# Patient Record
Sex: FEMALE | Race: WHITE | NOT HISPANIC OR LATINO | Employment: FULL TIME | URBAN - METROPOLITAN AREA
[De-identification: names, ages, dates, MRNs, and addresses within clinical notes are randomized per-mention and may not be internally consistent; named-entity substitution may affect disease eponyms.]

---

## 2019-07-24 LAB
EXTERNAL CHLAMYDIA RESULT: NEGATIVE
N GONORRHOEA RRNA SPEC QL PROBE: NEGATIVE

## 2019-11-14 ENCOUNTER — APPOINTMENT (OUTPATIENT)
Dept: LAB | Facility: CLINIC | Age: 54
End: 2019-11-14
Payer: COMMERCIAL

## 2019-11-14 ENCOUNTER — TRANSCRIBE ORDERS (OUTPATIENT)
Dept: LAB | Facility: CLINIC | Age: 54
End: 2019-11-14

## 2019-11-14 DIAGNOSIS — D46.9 MDS (MYELODYSPLASTIC SYNDROME) (HCC): Primary | ICD-10-CM

## 2019-11-14 DIAGNOSIS — D46.9 MDS (MYELODYSPLASTIC SYNDROME) (HCC): ICD-10-CM

## 2019-11-14 PROCEDURE — 36415 COLL VENOUS BLD VENIPUNCTURE: CPT

## 2019-11-15 LAB — MISCELLANEOUS LAB TEST RESULT: NORMAL

## 2020-07-10 LAB
HBA1C MFR BLD HPLC: 5.3 %
HBA1C MFR BLD HPLC: 5.3 %

## 2020-08-24 ENCOUNTER — TELEMEDICINE (OUTPATIENT)
Dept: GASTROENTEROLOGY | Facility: CLINIC | Age: 55
End: 2020-08-24
Payer: COMMERCIAL

## 2020-08-24 DIAGNOSIS — Z12.11 SCREENING FOR COLON CANCER: ICD-10-CM

## 2020-08-24 DIAGNOSIS — R79.89 ELEVATED LFTS: Primary | ICD-10-CM

## 2020-08-24 PROCEDURE — 99204 OFFICE O/P NEW MOD 45 MIN: CPT | Performed by: INTERNAL MEDICINE
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
## 2020-08-25 ENCOUNTER — TELEPHONE (OUTPATIENT)
Dept: GASTROENTEROLOGY | Facility: CLINIC | Age: 55
End: 2020-08-25

## 2020-08-25 NOTE — TELEPHONE ENCOUNTER
Pt left [facility] asking for a nurse to CB to [phone]; had a virtual appt yesterday w/ Dr Ronny Garza and has questions about what he is requesting she do

## 2020-08-25 NOTE — TELEPHONE ENCOUNTER
I reviewed the liver enzymes from August   These were not available yesterday at the visit  As she says these are almost completely normal   We can for go all the other tests and simply send her a slip for a CMP to be done in late September or early October  This shows the liver enzymes are normal no further workup is needed    If they are elevated I would then proceed with the studies ordered yesterday thanks

## 2020-08-25 NOTE — TELEPHONE ENCOUNTER
Called pt back, she was seen yesterday virtually and has some concerns about future testing  She notes her LFTs were elevated in July with fatigue but her August labs were essentially normal with Dr Yokasta Culp and has been feeling well  She did not have her results at her visit so would like Dr Terrell Cannon to review  As she is feeling well now and labs are good she prefers not to do further testing (labs and US) due to cost at this time unless you feel is necessary  Results obtained from [facility] and sent to [facility] for your review  Pt will await a call back with Dr Natalia rodriguez

## 2020-09-26 LAB
ALBUMIN SERPL-MCNC: 4.3 G/DL (ref 3.8–4.9)
ALBUMIN/GLOB SERPL: 2 {RATIO} (ref 1.2–2.2)
ALP SERPL-CCNC: 64 IU/L (ref 39–117)
ALT SERPL-CCNC: 46 IU/L (ref 0–32)
AST SERPL-CCNC: 39 IU/L (ref 0–40)
BILIRUB SERPL-MCNC: 0.4 MG/DL (ref 0–1.2)
BUN SERPL-MCNC: 8 MG/DL (ref 6–24)
BUN/CREAT SERPL: 15 (ref 9–23)
CALCIUM SERPL-MCNC: 9.2 MG/DL (ref 8.7–10.2)
CHLORIDE SERPL-SCNC: 102 MMOL/L (ref 96–106)
CO2 SERPL-SCNC: 27 MMOL/L (ref 20–29)
CREAT SERPL-MCNC: 0.54 MG/DL (ref 0.57–1)
GLOBULIN SER-MCNC: 2.1 G/DL (ref 1.5–4.5)
GLUCOSE SERPL-MCNC: 119 MG/DL (ref 65–99)
POTASSIUM SERPL-SCNC: 4.5 MMOL/L (ref 3.5–5.2)
PROT SERPL-MCNC: 6.4 G/DL (ref 6–8.5)
SL AMB EGFR AFRICAN AMERICAN: 123 ML/MIN/1.73
SL AMB EGFR NON AFRICAN AMERICAN: 107 ML/MIN/1.73
SODIUM SERPL-SCNC: 140 MMOL/L (ref 134–144)

## 2021-03-31 ENCOUNTER — NURSE TRIAGE (OUTPATIENT)
Dept: OTHER | Facility: OTHER | Age: 56
End: 2021-03-31

## 2021-03-31 DIAGNOSIS — Z20.828 SARS-ASSOCIATED CORONAVIRUS EXPOSURE: Primary | ICD-10-CM

## 2021-03-31 DIAGNOSIS — Z20.828 SARS-ASSOCIATED CORONAVIRUS EXPOSURE: ICD-10-CM

## 2021-03-31 PROCEDURE — U0005 INFEC AGEN DETEC AMPLI PROBE: HCPCS | Performed by: FAMILY MEDICINE

## 2021-03-31 PROCEDURE — U0003 INFECTIOUS AGENT DETECTION BY NUCLEIC ACID (DNA OR RNA); SEVERE ACUTE RESPIRATORY SYNDROME CORONAVIRUS 2 (SARS-COV-2) (CORONAVIRUS DISEASE [COVID-19]), AMPLIFIED PROBE TECHNIQUE, MAKING USE OF HIGH THROUGHPUT TECHNOLOGIES AS DESCRIBED BY CMS-2020-01-R: HCPCS | Performed by: FAMILY MEDICINE

## 2021-03-31 NOTE — TELEPHONE ENCOUNTER
Reason for Disposition   [1] COVID-19 infection suspected by caller or triager AND [2] mild symptoms (cough, fever, or others) AND [4] no complications or SOB    Answer Assessment - Initial Assessment Questions  Were you within 6 feet or less, for up to 15 minutes or more with a person that has a confirmed COVID-19 test?        yes      What was the date of your exposure? 3/23/21      Are you experiencing any symptoms attributed to the virus?  (Assess for SOB, cough, fever, difficulty breathing)        Yes  Cough, congestion      HIGH RISK: Do you have any history heart or lung conditions, weakened immune system, diabetes, Asthma, CHF, HIV, COPD, Chemo, renal failure, sickle cell, etc?        Denies      PREGNANCY: Are you pregnant or did you recently give birth?         Denies    Protocols used: CORONAVIRUS (COVID-19) [facility]AGNOSED OR [facility]USPECTED[facility]

## 2021-03-31 NOTE — TELEPHONE ENCOUNTER
Regarding: Symptomatic Covid Exposure 1/2  ----- Message from Jace Singh RN sent at 3/31/2021  5:36 PM EDT -----  Exposure symptomatic, friend

## 2021-04-01 ENCOUNTER — TELEPHONE (OUTPATIENT)
Dept: OTHER | Facility: OTHER | Age: 56
End: 2021-04-01

## 2021-04-01 LAB — SARS-COV-2 RNA RESP QL NAA+PROBE: POSITIVE

## 2021-04-01 NOTE — TELEPHONE ENCOUNTER
Your test for the novel coronavirus, also known as COVID-19, was positive  The sample showed that the virus was present  Positive COVID-19 test results are reportable to the [place] Department of Health  You may receive a call from trained public health staff to conduct an interview  It is important to answer their call  They will ask you to verify who you are  During the call they will ask you about what symptoms you have, what you did before you got sick, and who you were close to while sick  The health department does this to make sure everyone stays healthy and to reduce the spread of the virus  If you would like to verify if the caller does in fact work in contact tracing, call the [address] [place] [address] at [facility] ([phone])  For additional information, please visit the [facility]  website: www health [place] gov     If you have any additional questions, we can schedule a virtual visit for you with a provider or call the [facility] hotline [phone], option 7, for care advice    For additional information, please visit the Coronavirus FAQ on the [facility] home page ([facility])

## 2021-04-01 NOTE — TELEPHONE ENCOUNTER
The patient was called for notification of a test result for COVID-19  The patient did not answer the phone and a voicemail was left requesting a call back to [phone], Option 7

## 2021-11-09 ENCOUNTER — VBI (OUTPATIENT)
Dept: ADMINISTRATIVE | Facility: OTHER | Age: 56
End: 2021-11-09

## 2021-11-17 ENCOUNTER — ANNUAL EXAM (OUTPATIENT)
Dept: OBGYN CLINIC | Facility: CLINIC | Age: 56
End: 2021-11-17
Payer: COMMERCIAL

## 2021-11-17 VITALS
SYSTOLIC BLOOD PRESSURE: 122 MMHG | HEIGHT: 67 IN | DIASTOLIC BLOOD PRESSURE: 72 MMHG | WEIGHT: 139 LBS | BODY MASS INDEX: 21.82 KG/M2

## 2021-11-17 DIAGNOSIS — Z12.31 ENCOUNTER FOR SCREENING MAMMOGRAM FOR MALIGNANT NEOPLASM OF BREAST: ICD-10-CM

## 2021-11-17 DIAGNOSIS — Z12.11 SCREEN FOR COLON CANCER: ICD-10-CM

## 2021-11-17 DIAGNOSIS — Z01.419 WOMEN'S ANNUAL ROUTINE GYNECOLOGICAL EXAMINATION: Primary | ICD-10-CM

## 2021-11-17 DIAGNOSIS — N95.2 VAGINAL ATROPHY: ICD-10-CM

## 2021-11-17 PROCEDURE — 99396 PREV VISIT EST AGE 40-64: CPT | Performed by: STUDENT IN AN ORGANIZED HEALTH CARE EDUCATION/TRAINING PROGRAM

## 2021-11-17 RX ORDER — DIPHENOXYLATE HYDROCHLORIDE AND ATROPINE SULFATE 2.5; .025 MG/1; MG/1
1 TABLET ORAL DAILY
COMMUNITY

## 2021-11-17 RX ORDER — ESTRADIOL 0.1 MG/G
1 CREAM VAGINAL DAILY
Qty: 42.5 G | Refills: 3 | Status: SHIPPED | OUTPATIENT
Start: 2021-11-17 | End: 2022-03-07 | Stop reason: SDUPTHER

## 2022-01-03 ENCOUNTER — TELEPHONE (OUTPATIENT)
Dept: OBGYN CLINIC | Facility: CLINIC | Age: 57
End: 2022-01-03

## 2022-01-03 NOTE — TELEPHONE ENCOUNTER
Hubert Brasher was requesting Estradiol cream renewal  Informed Hubert Brasher of 11/17/21 Rx had three refills  She states her box doesn't have any information about refills   Informed Lincoln to go to [facility],[place] to obtain medication renewal

## 2022-01-05 ENCOUNTER — HOSPITAL ENCOUNTER (OUTPATIENT)
Dept: ULTRASOUND IMAGING | Facility: HOSPITAL | Age: 57
Discharge: HOME | End: 2022-01-05
Payer: COMMERCIAL

## 2022-01-05 ENCOUNTER — HOSPITAL ENCOUNTER (OUTPATIENT)
Dept: RADIOLOGY | Facility: HOSPITAL | Age: 57
Discharge: HOME | End: 2022-01-05
Payer: COMMERCIAL

## 2022-01-05 DIAGNOSIS — R10.9 UNSPECIFIED ABDOMINAL PAIN: ICD-10-CM

## 2022-01-05 DIAGNOSIS — R14.0 ABDOMINAL DISTENSION (GASEOUS): ICD-10-CM

## 2022-01-05 DIAGNOSIS — R82.90 UNSPECIFIED ABNORMAL FINDINGS IN URINE: ICD-10-CM

## 2022-01-05 DIAGNOSIS — R82.90 NONSPECIFIC FINDING ON EXAMINATION OF URINE: ICD-10-CM

## 2022-01-05 PROCEDURE — 74018 RADEX ABDOMEN 1 VIEW: CPT

## 2022-01-05 PROCEDURE — 76856 US EXAM PELVIC COMPLETE: CPT

## 2022-01-05 PROCEDURE — 76830 TRANSVAGINAL US NON-OB: CPT

## 2022-01-05 PROCEDURE — 76700 US EXAM ABDOM COMPLETE: CPT

## 2022-01-13 ENCOUNTER — OFFICE VISIT (OUTPATIENT)
Dept: GASTROENTEROLOGY | Facility: CLINIC | Age: 57
End: 2022-01-13
Payer: COMMERCIAL

## 2022-01-13 ENCOUNTER — TELEPHONE (OUTPATIENT)
Dept: GASTROENTEROLOGY | Facility: CLINIC | Age: 57
End: 2022-01-13

## 2022-01-13 VITALS
HEIGHT: 67 IN | DIASTOLIC BLOOD PRESSURE: 82 MMHG | WEIGHT: 141 LBS | SYSTOLIC BLOOD PRESSURE: 122 MMHG | BODY MASS INDEX: 22.13 KG/M2 | HEART RATE: 113 BPM

## 2022-01-13 DIAGNOSIS — K70.0 FATTY LIVER DUE TO ALCOHOLISM: Primary | ICD-10-CM

## 2022-01-13 DIAGNOSIS — Z12.11 COLON CANCER SCREENING: ICD-10-CM

## 2022-01-13 PROCEDURE — 99214 OFFICE O/P EST MOD 30 MIN: CPT | Performed by: INTERNAL MEDICINE

## 2022-01-13 NOTE — TELEPHONE ENCOUNTER
Pt left  mssg stating she saw [facility] today  She is reviewing her summary and it says something about colon cancer; states she does not have colon CA/it was not discussed  # [phone]

## 2022-01-13 NOTE — PROGRESS NOTES
[id number] - Outpatient Follow-up Note  Melquiades Jovel 61 y o  female MRN: [id number]  Encounter: [id number]    ASSESSMENT AND PLAN:      1  Fatty liver due to alcoholism  Chronic abnormal LFTs probably related to alcoholic fatty liver  Other etiologies have not been excluded especially since ALT is greater than AST  Worried about portal hypertension and early cirrhosis with thrombocytopenia although ultrasound did not show a liver that was suspicious for cirrhosis or show splenomegaly  - long discussion with patient about alcoholic liver disease and the importance of abstinence  - Hepatitis A antibody, total; Future  - HCV FIBROSURE; Future  - LESLIE w/Reflex; Future  - Fe+TIBC+Enrique; Future  - Comprehensive metabolic panel; Future  - CBC; Future  - Protime-INR; Future  - Hepatitis C antibody; Future  - Hepatitis B surface antigen; Future  - Hepatitis B surface antibody; Future  - Anti-smooth muscle antibody, IgG; Future      2  Colon cancer screening  Last colonoscopy January 2015  Due for follow-up 2025      Followup Appointment:  3 months  ______________________________________________________________________    Chief Complaint   Patient presents with    Us showed fatty liver     Referred by Dr Isas Roberts     HPI:  Patient is seen back in the office today secondary to an ultrasound showing a fatty liver  She was last seen by us in 2020 related to increased LFTs  Blood work was ordered but never got done  She has a long history of alcohol use disorder  She has a history of pancreatitis with a pseudocyst back in 2015  She had blood work done in August which showed some mild increased ALT and AST  She has had a persistent thrombocytopenia  A follow-up ultrasound showed hepatomegaly with steatosis and sludge in the gallbladder  There was nothing the suspect cirrhosis    She admits that she has decreased her drinking and now has 2 beers or a glass a wine after work each night and drinks more on the weekends  She reports prior to that she would often be doing shots  She has no history of DUI eyes or black out  She has no history of DTs or alcohol withdrawal seizures  She denies any ascites or lower extremity edema  She denies any GI bleeding  She denies any easy bleeding or bruising  Her weight is stable  Historical Information   Past Medical History:   Diagnosis Date    Fatty liver     LGSIL on Pap smear of cervix 2013    colposcopy/biopsy, returned to normal     Past Surgical History:   Procedure Laterality Date    COLONOSCOPY      FACIAL FRACTURE SURGERY      FEMUR FRACTURE SURGERY      MAMMO (HISTORICAL)  2020    [facility]    UPPER GASTROINTESTINAL ENDOSCOPY       Social History     Substance and Sexual Activity   Alcohol Use Yes     Social History     Substance and Sexual Activity   Drug Use Never     Social History     Tobacco Use   Smoking Status Light Tobacco Smoker    Types: Cigars   Smokeless Tobacco Never Used     Family History   Problem Relation Age of Onset    Cancer Father     Breast cancer Neg Hx     Ovarian cancer Neg Hx     Uterine cancer Neg Hx     Colon cancer Neg Hx     Colon polyps Neg Hx          Current Outpatient Medications:     estradiol (ESTRACE) 0 1 mg/g vaginal cream    multivitamin (THERAGRAN) TABS  No Known Allergies  Reviewed medications and allergies and updated as indicated    PHYSICAL EXAM:    Blood pressure 122/82, pulse (!) 113, height 5' 7" (1 702 m), weight 64 kg (141 lb)  Body mass index is 22 08 kg/m²  General Appearance: NAD, cooperative, alert  Eyes: Anicteric, PERRLA, EOMI  ENT:  Normocephalic, atraumatic, normal mucosa  Neck:  Supple, symmetrical, trachea midline  Resp:  Clear to auscultation bilaterally; no rales, rhonchi or wheezing; respirations unlabored   CV:  S1 S2, Regular rate and rhythm; no murmur, rub, or gallop    GI:  Soft, non-tender, non-distended; normal bowel sounds; no masses, no organomegaly Rectal: Deferred  Musculoskeletal: No cyanosis, clubbing or edema  Normal ROM  Skin:  No jaundice, rashes, or lesions   Heme/Lymph: No palpable cervical lymphadenopathy  Psych: Normal affect, good eye contact  Neuro: No gross deficits, AAOx3, no asterixis    Lab Results:   Hemoglobin 13 3, hematocrit 38 9, white count 3 1, platelet count 98  Sodium 134, potassium 4 7, chloride 95, bicarb 24, BUN 5, creatinine 0 66, glucose 83   ALT 75, AST 62, alk-phos 85, bilirubin 0 5   Cholesterol 192, triglycerides 108, and TSH 2 230  Hemoglobin A1C  5 3   (8/31/21)    Radiology Results:     US abdomen complete  Result Date: 1/6/2022   Impression: Hepatosplenomegaly Hepatic moderate steatosis   Gallbladder sludge

## 2022-01-13 NOTE — TELEPHONE ENCOUNTER
I spoke with patient and reviewed that AVS states colon cancer screening which is one of the dx codes used at GI visit  Patient's last colonoscopy 1/22/2015 with 10 year recall due 2025

## 2022-01-26 LAB
A2 MACROGLOB SERPL-MCNC: 132 MG/DL (ref 110–276)
ACTIN IGG SERPL-ACNC: 5 UNITS (ref 0–19)
ALBUMIN SERPL-MCNC: 4.1 G/DL (ref 3.8–4.9)
ALBUMIN/GLOB SERPL: 1.8 {RATIO} (ref 1.2–2.2)
ALP SERPL-CCNC: 74 IU/L (ref 44–121)
ALT SERPL W P-5'-P-CCNC: 30 IU/L (ref 0–40)
ALT SERPL-CCNC: 26 IU/L (ref 0–32)
ANA SER QL: NEGATIVE
APO A-I SERPL-MCNC: 134 MG/DL (ref 116–209)
AST SERPL-CCNC: 26 IU/L (ref 0–40)
BILIRUB SERPL-MCNC: 0.1 MG/DL (ref 0–1.2)
BILIRUB SERPL-MCNC: 0.2 MG/DL (ref 0–1.2)
BUN SERPL-MCNC: 7 MG/DL (ref 6–24)
BUN/CREAT SERPL: 11 (ref 9–23)
CALCIUM SERPL-MCNC: 9.3 MG/DL (ref 8.7–10.2)
CHLORIDE SERPL-SCNC: 95 MMOL/L (ref 96–106)
CO2 SERPL-SCNC: 21 MMOL/L (ref 20–29)
COMMENT: ABNORMAL
CREAT SERPL-MCNC: 0.61 MG/DL (ref 0.57–1)
ERYTHROCYTE [DISTWIDTH] IN BLOOD BY AUTOMATED COUNT: 11.6 % (ref 11.7–15.4)
FERRITIN SERPL-MCNC: 1035 NG/ML (ref 15–150)
FIBROSIS SCORING:: ABNORMAL
FIBROSIS STAGE SERPL QL: ABNORMAL
GGT SERPL-CCNC: 225 IU/L (ref 0–60)
GLOBULIN SER-MCNC: 2.3 G/DL (ref 1.5–4.5)
GLUCOSE SERPL-MCNC: 101 MG/DL (ref 65–99)
HAPTOGLOB SERPL-MCNC: 175 MG/DL (ref 33–346)
HAV AB SER QL IA: NEGATIVE
HBV SURFACE AB SER-ACNC: <3.1 MIU/ML
HBV SURFACE AG SERPL QL IA: NEGATIVE
HCT VFR BLD AUTO: 36.5 % (ref 34–46.6)
HCV AB S/CO SERPL IA: <0.1 S/CO RATIO (ref 0–0.9)
HGB BLD-MCNC: 12.1 G/DL (ref 11.1–15.9)
INR PPP: 1 (ref 0.9–1.2)
INTERPRETATIONS: ABNORMAL
IRON SATN MFR SERPL: 30 % (ref 15–55)
IRON SERPL-MCNC: 91 UG/DL (ref 27–159)
LIVER FIBR SCORE SERPL CALC.FIBROSURE: 0.08 (ref 0–0.21)
MCH RBC QN AUTO: 32.3 PG (ref 26.6–33)
MCHC RBC AUTO-ENTMCNC: 33.2 G/DL (ref 31.5–35.7)
MCV RBC AUTO: 97 FL (ref 79–97)
NECROINFLAMM ACTIVITY SCORING:: ABNORMAL
NECROINFLAMMATORY ACT GRADE SERPL QL: ABNORMAL
NECROINFLAMMATORY ACT SCORE SERPL: 0.11 (ref 0–0.17)
PLATELET # BLD AUTO: 200 X10E3/UL (ref 150–450)
POTASSIUM SERPL-SCNC: 4.6 MMOL/L (ref 3.5–5.2)
PROT SERPL-MCNC: 6.4 G/DL (ref 6–8.5)
PROTHROMBIN TIME: 10.3 SEC (ref 9.1–12)
RBC # BLD AUTO: 3.75 X10E6/UL (ref 3.77–5.28)
SERVICE CMNT-IMP: ABNORMAL
SL AMB EGFR AFRICAN AMERICAN: 116 ML/MIN/1.73
SL AMB EGFR NON AFRICAN AMERICAN: 101 ML/MIN/1.73
SODIUM SERPL-SCNC: 135 MMOL/L (ref 134–144)
TIBC SERPL-MCNC: 308 UG/DL (ref 250–450)
UIBC SERPL-MCNC: 217 UG/DL (ref 131–425)
WBC # BLD AUTO: 4 X10E3/UL (ref 3.4–10.8)

## 2022-01-28 ENCOUNTER — TELEPHONE (OUTPATIENT)
Dept: GASTROENTEROLOGY | Facility: CLINIC | Age: 57
End: 2022-01-28

## 2022-01-28 NOTE — TELEPHONE ENCOUNTER
I spoke with patient and reviewed result note  Reviewed that increased ferritin could be related on ETOH  Patient scheduled for follow up in April

## 2022-01-28 NOTE — TELEPHONE ENCOUNTER
Pt left [facility] stating she had BW a wk ago/calling for results  # [phone]

## 2022-02-02 DIAGNOSIS — R79.89 ELEVATED LFTS: Primary | ICD-10-CM

## 2022-02-02 NOTE — TELEPHONE ENCOUNTER
Discussed with patient  Blood work all looked relatively good  Patient is concerned about the elevated ferritin  Iron and % saturation all were normal   Acute phase reactant? On reasonable to check HFE gene though  I ordered in the computer  Does not have antibodies against hepatitis a or hepatitis-B so should be vaccinated  Reports that she has not drank any alcohol since our office visit  Follow up in the office as scheduled

## 2022-02-02 NOTE — TELEPHONE ENCOUNTER
Called pt back, she requested specific information about what lab test was abnormal  Discussed with her the ferritin level of 1,035  Informed her it is a protein in her blood that contains iron and can be caused by obesity, inflammation or daily alcohol  She questions if this is caused by her liver and if her liver is getting worse  When should she repeat any lab work? Her next apt is 4/13  Pt is requesting to speak with Dr Deandra Coe to discuss her result and the cause  [phone]  She is available today until 5

## 2022-02-09 NOTE — TELEPHONE ENCOUNTER
Pt inquiring about hemochromatosis testing  Advised results not available yet - may take a week  Pt wants to know if there is something she should be doing to help eliminate the excess ferritin until we get lab results/appt in April  Knows not to drink alcohol  Wants to know if she should follow any specific dietary restrictions? Has a natural supplement she takes occasionally that has 2 mg of iron in it and wonders if okay to use?

## 2022-02-11 NOTE — TELEPHONE ENCOUNTER
Spoke to [facility]  They reached out to reference lab  Expected date of result is 2/15/2022

## 2022-02-14 LAB — HFE GENE MUT ANL BLD/T: NORMAL

## 2022-02-14 NOTE — TELEPHONE ENCOUNTER
Left message on answering machine  HFE gene normal   No evidence hemochromatosis  Unclear why ferritin increase when rest iron studies were normal   Acute phase reactant? Corona Restrepo   No further workup needed from an iron standpoint

## 2022-03-07 ENCOUNTER — OFFICE VISIT (OUTPATIENT)
Dept: OBGYN CLINIC | Facility: CLINIC | Age: 57
End: 2022-03-07
Payer: COMMERCIAL

## 2022-03-07 VITALS
SYSTOLIC BLOOD PRESSURE: 122 MMHG | WEIGHT: 142.2 LBS | BODY MASS INDEX: 22.32 KG/M2 | DIASTOLIC BLOOD PRESSURE: 64 MMHG | HEIGHT: 67 IN

## 2022-03-07 DIAGNOSIS — N95.2 VAGINAL ATROPHY: Primary | ICD-10-CM

## 2022-03-07 PROCEDURE — 99213 OFFICE O/P EST LOW 20 MIN: CPT | Performed by: STUDENT IN AN ORGANIZED HEALTH CARE EDUCATION/TRAINING PROGRAM

## 2022-03-07 RX ORDER — ESTRADIOL 0.1 MG/G
1 CREAM VAGINAL 3 TIMES WEEKLY
Qty: 42.5 G | Refills: 3 | Status: SHIPPED | OUTPATIENT
Start: 2022-03-07

## 2022-03-07 NOTE — ASSESSMENT & PLAN NOTE
- Symptoms improved on vaginal estrace cream  Will continue  Reviewed recommendation to taper dose to lowest effective frequency of administration  Currently using three times weekly  Will trial twice weekly  - Patient instructed to call office if she has any episode of vaginal bleeding    - Rx refilled-- sent to [facility] in [place], where cost is lowest using [employer] discount card  - Return to office for annual exam or PRN

## 2022-03-07 NOTE — PROGRESS NOTES
Zhou BustsoLisa, [address]    Assessment/Plan:  1  Vaginal atrophy  Assessment & Plan:  - Symptoms improved on vaginal estrace cream  Will continue  Reviewed recommendation to taper dose to lowest effective frequency of administration  Currently using three times weekly  Will trial twice weekly  - Patient instructed to call office if she has any episode of vaginal bleeding    - Rx refilled-- sent to [facility] in [place], where cost is lowest using [employer] card  - Return to office for annual exam or PRN  Orders:  -     estradiol (ESTRACE) 0 1 mg/g vaginal cream; Insert 1 g into the vagina 3 (three) times a week Use 3 times weekly  Use lowest frequency necessary to alleviate symptoms      Subjective:   Crystal Hermosillo is a 61 y o  [id number]   CC:   Chief Complaint   Patient presents with    Follow-up     medication check       HPI: Patient presents to review response to topical estrace therapy  Reports improvement in dyspareunia and vaginal dryness symptoms  She denies vaginal bleeding or vaginal irritation  Has been using cream three times weekly  Concerned about out-of-pocket cost and reports that she is paying approximately $80 per tube  ROS: Negative except as noted in HPI    No LMP recorded  Patient is postmenopausal        She  reports being sexually active and has had partner(s) who are male   She reports using the following method of birth control/protection: Post-menopausal        The following portions of the patient's history were reviewed and updated as appropriate:   Past Medical History:   Diagnosis Date    Fatty liver     LGSIL on Pap smear of cervix 2013    colposcopy/biopsy, returned to normal     Past Surgical History:   Procedure Laterality Date    COLONOSCOPY      FACIAL FRACTURE SURGERY      FEMUR FRACTURE SURGERY      MAMMO (HISTORICAL)  11/30/2020    [facility] BI-RADS 1 (after U/S) Scattered areas fibroglandular density 25% to 50 % glandular breat tissue    UPPER GASTROINTESTINAL ENDOSCOPY       Family History   Problem Relation Age of Onset    Cancer Father     Breast cancer Neg Hx     Ovarian cancer Neg Hx     Uterine cancer Neg Hx     Colon cancer Neg Hx     Colon polyps Neg Hx      Social History     Socioeconomic History    Marital status: Single     Spouse name: None    Number of children: None    Years of education: None    Highest education level: None   Occupational History    None   Tobacco Use    Smoking status: Light Tobacco Smoker     Types: Cigars    Smokeless tobacco: Never Used   Vaping Use    Vaping Use: Never used   Substance and Sexual Activity    Alcohol use: Yes    Drug use: Never    Sexual activity: Yes     Partners: Male     Birth control/protection: Post-menopausal     Comment: no new partner in past year   Other Topics Concern    None   Social History Narrative    Exercise: 2-3 times a week    Domestic violence: No    currently sexually active, no new partner in last year     Social Determinants of Health     Financial Resource Strain: Not on file   Food Insecurity: Not on file   Transportation Needs: Not on file   Physical Activity: Not on file   Stress: Not on file   Social Connections: Not on file   Intimate Partner Violence: Not on file   Housing Stability: Not on file     Outpatient Medications Marked as Taking for the 3/7/22 encounter (Office Visit) with Cristine Alvares MD   Medication    estradiol (ESTRACE) 0 1 mg/g vaginal cream    multivitamin (THERAGRAN) TABS    [DISCONTINUED] estradiol (ESTRACE) 0 1 mg/g vaginal cream     No Known Allergies        Objective:  /64 (BP Location: Left arm, Patient Position: Sitting, Cuff Size: Standard)   Ht 5' 6 5" (1 689 m)   Wt 64 5 kg (142 lb 3 2 oz)   Breastfeeding No   BMI 22 61 kg/m²        Chaperone present? Yes: Christine Noel MA  General Appearance: alert and oriented, in no acute distress     Abdomen: Soft, non-tender, non-distended, no masses, no rebound or guarding  Pelvic:       External genitalia: Normal appearance, no abnormal pigmentation, no lesions or masses  Normal Bartholin's and [place]'s  Urinary system: Urethral meatus normal, bladder non-tender  Vaginal: Atrophic mucosa, improved from prior  No prolapse or lesions  Normal-appearing physiologic discharge  No masses or lesions  Cervix: Normal-appearing, well-epithelialized, no gross lesions or masses  Extremities: Normal range of motion     Skin: normal, no rash or abnormalities  Neurologic: alert, oriented x3  Psychiatric: Appropriate affect, mood stable, cooperative with exam         Phi Carranza MD  3/7/2022 8:58 AM

## 2022-04-13 ENCOUNTER — OFFICE VISIT (OUTPATIENT)
Dept: GASTROENTEROLOGY | Facility: CLINIC | Age: 57
End: 2022-04-13
Payer: COMMERCIAL

## 2022-04-13 VITALS
DIASTOLIC BLOOD PRESSURE: 84 MMHG | WEIGHT: 140 LBS | HEIGHT: 67 IN | SYSTOLIC BLOOD PRESSURE: 134 MMHG | BODY MASS INDEX: 21.97 KG/M2

## 2022-04-13 DIAGNOSIS — Z12.11 COLON CANCER SCREENING: ICD-10-CM

## 2022-04-13 DIAGNOSIS — K70.0 FATTY LIVER DUE TO ALCOHOLISM: Primary | ICD-10-CM

## 2022-04-13 PROCEDURE — 99213 OFFICE O/P EST LOW 20 MIN: CPT | Performed by: INTERNAL MEDICINE

## 2022-04-13 NOTE — PROGRESS NOTES
[id number] - Outpatient Follow-up Note  Francois Kilpatrick 61 y o  female MRN: [id number]  Encounter: [id number]    ASSESSMENT AND PLAN:      1  Fatty liver due to alcoholism  Fatty liver related to alcohol use  No evidence of inflammation or fibrosis on fiber score  LFTs have normalized  - Iron Panel (Includes Ferritin, Iron Sat%, Iron, and TIBC); Future  - Hepatic function panel; Future (1 year)  - CBC; Future (1 year)  - Fe+TIBC+Enrique (now) to document ferritin normalized  - continue to stressed the importance of abstinence or at least extremely limiting alcohol intake  - should get hepatitis-A and hepatitis-B vaccines 3 PCPs office    2  Colon cancer screening  Last colonoscopy January 2015  Due for follow-up 2025      Followup Appointment:  1 year  ______________________________________________________________________    Chief Complaint   Patient presents with    Follow-up LFT's, fatty liver     HPI:  The patient is seen back in the office today  She was evaluated for increased ALT and a fatty liver  She also has history of thrombocytopenia  She does admit to a significant amount of alcohol abuse  She reports that she has stopped drinking  She continues to be asymptomatic from a GI standpoint  Extensive serologic workup was performed  Significant findings included a normal iron panel but an elevated ferritin  And HFE gene was negative for hemochromatosis  Repeat LFTs and CBC were normal   Fiber score showed A0/F0    She does not have immunity against hepatitis-A or hepatitis-B    Historical Information   Past Medical History:   Diagnosis Date    Fatty liver     LGSIL on Pap smear of cervix 2013    colposcopy/biopsy, returned to normal     Past Surgical History:   Procedure Laterality Date    COLONOSCOPY      FACIAL FRACTURE SURGERY      FEMUR FRACTURE SURGERY      MAMMO (HISTORICAL)  11/30/2020    [facility] BI-RADS 1 (after U/S) Scattered areas fibroglandular density 25% to 50 % glandular breat tissue    UPPER GASTROINTESTINAL ENDOSCOPY       Social History     Substance and Sexual Activity   Alcohol Use Yes     Social History     Substance and Sexual Activity   Drug Use Never     Social History     Tobacco Use   Smoking Status Light Tobacco Smoker    Types: Cigars   Smokeless Tobacco Never Used     Family History   Problem Relation Age of Onset    Cancer Father     Breast cancer Neg Hx     Ovarian cancer Neg Hx     Uterine cancer Neg Hx     Colon cancer Neg Hx     Colon polyps Neg Hx          Current Outpatient Medications:     estradiol (ESTRACE) 0 1 mg/g vaginal cream    multivitamin (THERAGRAN) TABS  No Known Allergies  Reviewed medications and allergies and updated as indicated    PHYSICAL EXAM:    Blood pressure 134/84, height 5' 6 5" (1 689 m), weight 63 5 kg (140 lb), not currently breastfeeding  Body mass index is 22 26 kg/m²  General Appearance: NAD, cooperative, alert  Eyes: Anicteric, PERRLA, EOMI  ENT:  Normocephalic, atraumatic, normal mucosa  Neck:  Supple, symmetrical, trachea midline  Resp:  Clear to auscultation bilaterally; no rales, rhonchi or wheezing; respirations unlabored   CV:  S1 S2, Regular rate and rhythm; no murmur, rub, or gallop  GI:  Soft, non-tender, non-distended; normal bowel sounds; no masses, no organomegaly   Rectal: Deferred  Musculoskeletal: No cyanosis, clubbing or edema  Normal ROM    Skin:  No jaundice, rashes, or lesions   Heme/Lymph: No palpable cervical lymphadenopathy  Psych: Normal affect, good eye contact  Neuro: No gross deficits, AAOx3    Lab Results:   Lab Results   Component Value Date    WBC 4 0 01/21/2022    HGB 12 1 01/21/2022    HCT 36 5 01/21/2022    MCV 97 01/21/2022     01/21/2022     Lab Results   Component Value Date    K 4 6 01/21/2022    CL 95 (L) 01/21/2022    CO2 21 01/21/2022    BUN 7 01/21/2022    CREATININE 0 61 01/21/2022    AST 26 01/21/2022    ALT 30 01/21/2022    ALT 26 01/21/2022 Lab Results   Component Value Date    IRON 91 01/21/2022    TIBC 308 01/21/2022    FERRITIN 1,035 (H) 01/21/2022       Radiology Results:   No results found

## 2022-04-13 NOTE — LETTER
April 13, 2022     Trevor Buerger, MD  [place] [facility] [address]    Patient: Fouzia Bustamante   YOB: 1965   Date of Visit: 4/13/2022       Dear Dr Aysha Curiel: Thank you for referring Aida Sender to me for evaluation  Below are my notes for this consultation  If you have questions, please do not hesitate to call me  I look forward to following your patient along with you  Sincerely,        Blaine Ge MD        CC: No Recipients  Blaine Ge MD  4/13/2022  5:10 PM  Sign when Signing Visit  [facility] - Outpatient Follow-up Note  Fouzia Bustamante 61 y o  female MRN: [id number]  Encounter: [id number]    ASSESSMENT AND PLAN:      1  Fatty liver due to alcoholism  Fatty liver related to alcohol use  No evidence of inflammation or fibrosis on fiber score  LFTs have normalized  - Iron Panel (Includes Ferritin, Iron Sat%, Iron, and TIBC); Future  - Hepatic function panel; Future (1 year)  - CBC; Future (1 year)  - Fe+TIBC+Enrique (now) to document ferritin normalized  - continue to stressed the importance of abstinence or at least extremely limiting alcohol intake  - should get hepatitis-A and hepatitis-B vaccines 3 [facility] office    2  Colon cancer screening  Last colonoscopy January 2015  Due for follow-up 2025      Followup Appointment:  1 year  ______________________________________________________________________    Chief Complaint   Patient presents with    Follow-up LFT's, fatty liver     HPI:  The patient is seen back in the office today  She was evaluated for increased ALT and a fatty liver  She also has history of thrombocytopenia  She does admit to a significant amount of alcohol abuse  She reports that she has stopped drinking  She continues to be asymptomatic from a GI standpoint  Extensive serologic workup was performed  Significant findings included a normal iron panel but an elevated ferritin    And HFE gene was negative for hemochromatosis  Repeat LFTs and CBC were normal   Fiber score showed A0/F0  She does not have immunity against hepatitis-A or hepatitis-B    Historical Information   Past Medical History:   Diagnosis Date    Fatty liver     LGSIL on Pap smear of cervix 2013    colposcopy/biopsy, returned to normal     Past Surgical History:   Procedure Laterality Date    COLONOSCOPY      FACIAL FRACTURE SURGERY      FEMUR FRACTURE SURGERY      MAMMO (HISTORICAL)  11/30/2020    [facility] BI-RADS 1 (after U/S) Scattered areas fibroglandular density 25% to 50 % glandular breat tissue    UPPER GASTROINTESTINAL ENDOSCOPY       Social History     Substance and Sexual Activity   Alcohol Use Yes     Social History     Substance and Sexual Activity   Drug Use Never     Social History     Tobacco Use   Smoking Status Light Tobacco Smoker    Types: Cigars   Smokeless Tobacco Never Used     Family History   Problem Relation Age of Onset    Cancer Father     Breast cancer Neg Hx     Ovarian cancer Neg Hx     Uterine cancer Neg Hx     Colon cancer Neg Hx     Colon polyps Neg Hx          Current Outpatient Medications:     estradiol (ESTRACE) 0 1 mg/g vaginal cream    multivitamin (THERAGRAN) TABS  No Known Allergies  Reviewed medications and allergies and updated as indicated    PHYSICAL EXAM:    Blood pressure 134/84, height 5' 6 5" (1 689 m), weight 63 5 kg (140 lb), not currently breastfeeding  Body mass index is 22 26 kg/m²  General Appearance: NAD, cooperative, alert  Eyes: Anicteric, PERRLA, EOMI  ENT:  Normocephalic, atraumatic, normal mucosa  Neck:  Supple, symmetrical, trachea midline  Resp:  Clear to auscultation bilaterally; no rales, rhonchi or wheezing; respirations unlabored   CV:  S1 S2, Regular rate and rhythm; no murmur, rub, or gallop  GI:  Soft, non-tender, non-distended; normal bowel sounds; no masses, no organomegaly   Rectal: Deferred  Musculoskeletal: No cyanosis, clubbing or edema   Normal ROM   Skin:  No jaundice, rashes, or lesions   Heme/Lymph: No palpable cervical lymphadenopathy  Psych: Normal affect, good eye contact  Neuro: No gross deficits, AAOx3    Lab Results:   Lab Results   Component Value Date    WBC 4 0 01/21/2022    HGB 12 1 01/21/2022    HCT 36 5 01/21/2022    MCV 97 01/21/2022     01/21/2022     Lab Results   Component Value Date    K 4 6 01/21/2022    CL 95 (L) 01/21/2022    CO2 21 01/21/2022    BUN 7 01/21/2022    CREATININE 0 61 01/21/2022    AST 26 01/21/2022    ALT 30 01/21/2022    ALT 26 01/21/2022     Lab Results   Component Value Date    IRON 91 01/21/2022    TIBC 308 01/21/2022    FERRITIN 1,035 (H) 01/21/2022       Radiology Results:   No results found

## 2022-04-13 NOTE — PATIENT INSTRUCTIONS
Recheck iron panel today  Recheck CBC and LFTs 1 year  Vaccination against hepatitis-A and hepatitis-B  Continue to abstain from significant alcohol    Follow-up office visit 1 year

## 2022-04-15 LAB
ALBUMIN SERPL-MCNC: 4.9 G/DL (ref 3.8–4.9)
ALP SERPL-CCNC: 78 IU/L (ref 44–121)
ALT SERPL-CCNC: 17 IU/L (ref 0–32)
AST SERPL-CCNC: 21 IU/L (ref 0–40)
BILIRUB DIRECT SERPL-MCNC: 0.19 MG/DL (ref 0–0.4)
BILIRUB SERPL-MCNC: 0.7 MG/DL (ref 0–1.2)
ERYTHROCYTE [DISTWIDTH] IN BLOOD BY AUTOMATED COUNT: 12.2 % (ref 11.7–15.4)
FERRITIN SERPL-MCNC: 284 NG/ML (ref 15–150)
HCT VFR BLD AUTO: 38.7 % (ref 34–46.6)
HGB BLD-MCNC: 13.4 G/DL (ref 11.1–15.9)
IRON SATN MFR SERPL: 36 % (ref 15–55)
IRON SERPL-MCNC: 116 UG/DL (ref 27–159)
MCH RBC QN AUTO: 31.7 PG (ref 26.6–33)
MCHC RBC AUTO-ENTMCNC: 34.6 G/DL (ref 31.5–35.7)
MCV RBC AUTO: 92 FL (ref 79–97)
PLATELET # BLD AUTO: 136 X10E3/UL (ref 150–450)
PROT SERPL-MCNC: 7 G/DL (ref 6–8.5)
RBC # BLD AUTO: 4.23 X10E6/UL (ref 3.77–5.28)
TIBC SERPL-MCNC: 326 UG/DL (ref 250–450)
UIBC SERPL-MCNC: 210 UG/DL (ref 131–425)
WBC # BLD AUTO: 3.6 X10E3/UL (ref 3.4–10.8)

## 2022-04-21 ENCOUNTER — TELEPHONE (OUTPATIENT)
Dept: GASTROENTEROLOGY | Facility: CLINIC | Age: 57
End: 2022-04-21

## 2022-04-21 NOTE — TELEPHONE ENCOUNTER
Pt states she recently had BW done; would like to receive copies of BW from last wk and January so she can provide to her employer for req'd testing/wants reports mailed to her  If Lesli Hennessy # [id number]  Labs mailed to Pt

## 2022-10-12 PROBLEM — Z12.11 COLON CANCER SCREENING: Status: RESOLVED | Noted: 2022-01-13 | Resolved: 2022-10-12

## 2022-11-28 ENCOUNTER — ANNUAL EXAM (OUTPATIENT)
Dept: OBGYN CLINIC | Facility: CLINIC | Age: 57
End: 2022-11-28

## 2022-11-28 VITALS
DIASTOLIC BLOOD PRESSURE: 78 MMHG | HEIGHT: 66 IN | SYSTOLIC BLOOD PRESSURE: 130 MMHG | WEIGHT: 134.8 LBS | BODY MASS INDEX: 21.66 KG/M2

## 2022-11-28 DIAGNOSIS — Z12.31 BREAST CANCER SCREENING BY MAMMOGRAM: ICD-10-CM

## 2022-11-28 DIAGNOSIS — Z01.419 WOMEN'S ANNUAL ROUTINE GYNECOLOGICAL EXAMINATION: Primary | ICD-10-CM

## 2022-11-28 DIAGNOSIS — Z12.4 SCREENING FOR CERVICAL CANCER: ICD-10-CM

## 2022-11-28 DIAGNOSIS — N95.2 VAGINAL ATROPHY: ICD-10-CM

## 2022-11-28 RX ORDER — FAMOTIDINE 10 MG
10 TABLET ORAL 2 TIMES DAILY
COMMUNITY

## 2022-11-28 RX ORDER — OMEPRAZOLE 20 MG/1
CAPSULE, DELAYED RELEASE ORAL EVERY 24 HOURS
COMMUNITY

## 2022-11-28 NOTE — ASSESSMENT & PLAN NOTE
- Symptoms stable with once weekly vaginal estradiol cream  Will continue  Patient reports that she does not need refill at this time  Will call office if refill needed prior to next annual visit   Rx can be provided upon request

## 2022-11-28 NOTE — PROGRESS NOTES
[address] [facility]   [address] [place] [address], [address]    ASSESSMENT/PLAN: Ed Soler is a 61 y o  [id number] who presents for annual gynecologic exam     Encounter for routine gynecologic examination  - Routine well woman exam completed today  - Cervical Cancer Screening: Current ASCCP Guidelines reviewed  Last Pap: 07/18/2019  History of abnormal: Yes- URBANO-1 2013, returned to normal   - STI screening offered including HIV testing: offered, pt declined  - Breast Cancer Screening: Last Mammogram 12/10/2021, repeat ordered today  - Colorectal cancer screening was not ordered, as she reports that she is up to date  Next due 2024  - The following were reviewed in today's visit: breast self exam, mammography screening ordered, exercise, healthy diet and tobacco cessation    Additional problems addressed during this visit:  1  Women's annual routine gynecological examination    2  Breast cancer screening by mammogram  -     Mammo screening bilateral w 3d & cad; Future    3  Screening for cervical cancer  -     IGP, Aptima HPV, Rfx 16/18,45    4  Vaginal atrophy  Assessment & Plan:  - Symptoms stable with once weekly vaginal estradiol cream  Will continue  Patient reports that she does not need refill at this time  Will call office if refill needed prior to next annual visit  Rx can be provided upon request          CC:  Annual Gynecologic Examination    HPI: dE Soler is a 61 y o  [id number] who presents for annual gynecologic examination  She is without complaint today  ROS: Negative except as noted in HPI    No LMP recorded  Patient is postmenopausal   Menstrual History  Period Pattern:  (Postmenopausal)    She  reports being sexually active and has had partner(s) who are male   She reports using the following method of birth control/protection: Post-menopausal   Sexual History  Sexual Assault: No  Sexually Transmitted Infection History: None  Multiple Sex Partners: No  Is Patient in a Monogamous Relationship?: Yes    The following portions of the patient's history were reviewed and updated as appropriate:  Past Medical History:   Diagnosis Date   • Fatty liver    • LGSIL on Pap smear of cervix 2013    colposcopy/biopsy, returned to normal     Past Surgical History:   Procedure Laterality Date   • COLONOSCOPY     • FACIAL FRACTURE SURGERY     • FEMUR FRACTURE SURGERY     • MAMMO (HISTORICAL)  11/30/2020    [facility] BI-RADS 1 (after U/S) Scattered areas fibroglandular density 25% to 50 % glandular breat tissue   • UPPER GASTROINTESTINAL ENDOSCOPY       Family History   Problem Relation Age of Onset   • Heart murmur Mother    • Cancer Father    • No Known Problems Daughter    • No Known Problems Son    • No Known Problems Son    • Heart attack Maternal Grandmother    • Diabetes Maternal Grandmother    • Heart attack Maternal Grandfather    • No Known Problems Paternal Grandmother    • Alzheimer's disease Paternal Grandfather    • Breast cancer Neg Hx    • Ovarian cancer Neg Hx    • Uterine cancer Neg Hx    • Colon cancer Neg Hx    • Colon polyps Neg Hx      Social History     Socioeconomic History   • Marital status: Single     Spouse name: None   • Number of children: None   • Years of education: None   • Highest education level: None   Occupational History   • None   Tobacco Use   • Smoking status: Light Smoker     Types: Cigars   • Smokeless tobacco: Never   Vaping Use   • Vaping Use: Never used   Substance and Sexual Activity   • Alcohol use: Yes     Comment: Socially   • Drug use: Never   • Sexual activity: Yes     Partners: Male     Birth control/protection: Post-menopausal     Comment: no new partner in past year   Other Topics Concern   • None   Social History Narrative    Exercise: 2-3 times a week    Domestic violence: No    currently sexually active, no new partner in last year     Social Determinants of Health     Financial Resource Strain: Not on file   Food Insecurity: Not on file   Transportation Needs: Not on file   Physical Activity: Not on file   Stress: Not on file   Social Connections: Not on file   Intimate Partner Violence: Not on file   Housing Stability: Not on file     Outpatient Medications Marked as Taking for the 11/28/22 encounter (Annual Exam) with Loren Delgado MD   Medication   • estradiol (ESTRACE) 0 1 mg/g vaginal cream   • famotidine (PEPCID) 10 mg tablet   • multivitamin (THERAGRAN) TABS   • omeprazole (PriLOSEC) 20 mg delayed release capsule     No Known Allergies        Objective:  /78 (BP Location: Left arm, Patient Position: Sitting, Cuff Size: Standard)   Ht 5' 6 25" (1 683 m)   Wt 61 1 kg (134 lb 12 8 oz)   Breastfeeding No   BMI 21 59 kg/m²        Chaperone present? Yes: Alejandro Villalta MA  General Appearance: alert and oriented, in no acute distress  Neck/Thyroid: No thyromegaly, no thyroid nodules  Respiratory: Unlabored breathing  Cardiovascular: Regular rate, no peripheral edema  Abdomen: Soft, non-tender, non-distended, no masses, no rebound or guarding  Breast Exam: No dimpling, nipple retraction or discharge  No lumps or masses  No axillary or supraclavicular nodes  Pelvic:       External genitalia: Normal appearance, no abnormal pigmentation, no lesions or masses  Normal Bartholin's and Keyser's  Urinary system: Urethral meatus normal, bladder non-tender  Vaginal: normal mucosa without prolapse or lesions  Normal-appearing physiologic discharge  Cervix: Normal-appearing, well-epithelialized, no gross lesions or masses  No cervical motion tenderness  Light bleeding occurred with collection of pap  Adnexa: No adnexal masses or tenderness noted  Uterus: Normal-sized, regular contour, midline, mobile, no uterine tenderness  Extremities: Normal range of motion  Warm, well-perfused, non-tender     Skin: normal, no rash or abnormalities  Neurologic: alert, oriented x3  Psychiatric: Appropriate affect, mood stable, cooperative with exam     Yaa Calle MD  11/28/2022 1:08 PM

## 2022-12-03 LAB
CYTOLOGIST CVX/VAG CYTO: NORMAL
DX ICD CODE: NORMAL
HPV GENOTYPE REFLEX: NORMAL
HPV I/H RISK 4 DNA CVX QL PROBE+SIG AMP: NEGATIVE
OTHER STN SPEC: NORMAL
PATH REPORT.FINAL DX SPEC: NORMAL
SL AMB NOTE:: NORMAL
SL AMB SPECIMEN ADEQUACY: NORMAL
SL AMB TEST METHODOLOGY: NORMAL

## 2023-01-03 ENCOUNTER — TELEPHONE (OUTPATIENT)
Dept: GASTROENTEROLOGY | Facility: CLINIC | Age: 58
End: 2023-01-03

## 2023-01-03 DIAGNOSIS — K70.0 FATTY LIVER DUE TO ALCOHOLISM: Primary | ICD-10-CM

## 2023-01-03 DIAGNOSIS — R79.89 ELEVATED LFTS: ICD-10-CM

## 2023-01-03 NOTE — TELEPHONE ENCOUNTER
Called pt back, she is due for her recall ov in April and questioning if can have labs drawn prior  Ok to order a CBC, LFT and iron panel prior to her visit?

## 2023-01-03 NOTE — TELEPHONE ENCOUNTER
Patients GI provider:  Dr Deandra Coe    Number to return call: [phone]    Reason for call: Pt calling asking if she needs to be seen in the office first, or if she is just able to have her lab work ordered before seeing Dr Deandra Coe again in office      Scheduled procedure/appointment date if applicable: N/A

## 2023-01-05 DIAGNOSIS — Z12.31 BREAST CANCER SCREENING BY MAMMOGRAM: ICD-10-CM

## 2023-01-12 ENCOUNTER — TELEPHONE (OUTPATIENT)
Dept: OBGYN CLINIC | Facility: CLINIC | Age: 58
End: 2023-01-12

## 2023-01-12 DIAGNOSIS — N95.2 VAGINAL ATROPHY: ICD-10-CM

## 2023-01-12 NOTE — TELEPHONE ENCOUNTER
Pt was seen for Diomedes Brandon on 11/28/22 had reported  that she did not need refill at the time of her Diomedes Jaminrt but will call office when refill needed  Pt is requesting to have a refill for her Estrace cream to [facility] to [facility]

## 2023-01-13 ENCOUNTER — TELEPHONE (OUTPATIENT)
Dept: GASTROENTEROLOGY | Facility: CLINIC | Age: 58
End: 2023-01-13

## 2023-01-13 RX ORDER — ESTRADIOL 0.1 MG/G
1 CREAM VAGINAL 3 TIMES WEEKLY
Qty: 42.5 G | Refills: 3 | Status: SHIPPED | OUTPATIENT
Start: 2023-01-13

## 2023-01-13 NOTE — TELEPHONE ENCOUNTER
Informed Rx request has been processed, pt informed she would like to use the [facility] pharmacy  Instructed to have [facility] to [facility]

## 2023-01-13 NOTE — TELEPHONE ENCOUNTER
Patients GI provider:  Dr Florence Azul    Number to return call: [phone]    Reason for call: Pt called in stating that she is having reflux and would like to know if there is anything else she can take for it  Above is her number       Scheduled procedure/appointment date if applicable: Apt/procedure NA

## 2023-01-17 NOTE — TELEPHONE ENCOUNTER
SARAH--  Pt called back, she has been having "reflux" symptoms for approximately 6 months and is getting worse  She is experiencing the acid coming up in her throat mostly at night and makes her feel nauseated  Denies vomiting or pain  She has been taking Omeprazole 20 mg prn at times or Pepcid prn when the acid starts and is having to take more often  She is asking for recommendation or if any tests can be done  Pt is due for her follow up ov 4/2023 for fatty liver and labs prior that have already been ordered  Suggested to pt to take the Omeprazole 20 mg every day 30 minutes before breakfast and the Pepcid every HS not prn  Reviewed reflux diet recommendations, elevating the head of her bed as well as eating smaller more frequent meals  Pt is agreeable, transferred to scheduling and now has an ov 4/14 to follow up  Encouraged pt to call back prior if the medication regime is not effective for her-pt agreeable

## 2023-01-18 NOTE — TELEPHONE ENCOUNTER
Called pt back to offer an apt today if needed and she is willing to try her medication regularly first to see if improves  She will call back if symptoms continue

## 2023-02-11 LAB
ALBUMIN SERPL-MCNC: 4.5 G/DL (ref 3.8–4.9)
ALP SERPL-CCNC: 80 IU/L (ref 44–121)
ALT SERPL-CCNC: 20 IU/L (ref 0–32)
AST SERPL-CCNC: 25 IU/L (ref 0–40)
BASOPHILS # BLD AUTO: 0 X10E3/UL (ref 0–0.2)
BASOPHILS NFR BLD AUTO: 1 %
BILIRUB DIRECT SERPL-MCNC: 0.12 MG/DL (ref 0–0.4)
BILIRUB SERPL-MCNC: 0.4 MG/DL (ref 0–1.2)
EOSINOPHIL # BLD AUTO: 0.2 X10E3/UL (ref 0–0.4)
EOSINOPHIL NFR BLD AUTO: 5 %
ERYTHROCYTE [DISTWIDTH] IN BLOOD BY AUTOMATED COUNT: 11.8 % (ref 11.7–15.4)
FERRITIN SERPL-MCNC: 368 NG/ML (ref 15–150)
HCT VFR BLD AUTO: 38.8 % (ref 34–46.6)
HGB BLD-MCNC: 13.1 G/DL (ref 11.1–15.9)
IMM GRANULOCYTES # BLD: 0 X10E3/UL (ref 0–0.1)
IMM GRANULOCYTES NFR BLD: 0 %
IRON SATN MFR SERPL: 31 % (ref 15–55)
IRON SERPL-MCNC: 93 UG/DL (ref 27–159)
LYMPHOCYTES # BLD AUTO: 1.6 X10E3/UL (ref 0.7–3.1)
LYMPHOCYTES NFR BLD AUTO: 34 %
MCH RBC QN AUTO: 31.6 PG (ref 26.6–33)
MCHC RBC AUTO-ENTMCNC: 33.8 G/DL (ref 31.5–35.7)
MCV RBC AUTO: 94 FL (ref 79–97)
MONOCYTES # BLD AUTO: 0.3 X10E3/UL (ref 0.1–0.9)
MONOCYTES NFR BLD AUTO: 7 %
NEUTROPHILS # BLD AUTO: 2.4 X10E3/UL (ref 1.4–7)
NEUTROPHILS NFR BLD AUTO: 53 %
PLATELET # BLD AUTO: 153 X10E3/UL (ref 150–450)
PROT SERPL-MCNC: 6.8 G/DL (ref 6–8.5)
RBC # BLD AUTO: 4.15 X10E6/UL (ref 3.77–5.28)
TIBC SERPL-MCNC: 304 UG/DL (ref 250–450)
UIBC SERPL-MCNC: 211 UG/DL (ref 131–425)
WBC # BLD AUTO: 4.6 X10E3/UL (ref 3.4–10.8)

## 2023-04-14 PROBLEM — K21.9 GERD (GASTROESOPHAGEAL REFLUX DISEASE): Status: ACTIVE | Noted: 2023-04-14

## 2023-04-14 NOTE — H&P (VIEW-ONLY)
[facility] - Outpatient Follow-up Note  Jose Brown 61 y o  female MRN: [id number]  Encounter: [id number]    ASSESSMENT AND PLAN:      1  Gastroesophageal reflux disease, unspecified whether esophagitis present  Patient with 6 months of increasing issues with GERD  No improvement with Pepcid  Symptoms improved with over-the-counter Prilosec but recurred when completed 14-day course  Apprehensive about Prilosec since she thinks she was taking it around the time she had pancreatitis  Last EGD 2015  -Stop Prilosec  - famotidine (PEPCID) 40 MG tablet; Take 1 tablet (40 mg total) by mouth daily at bedtime  Dispense: 30 tablet; Refill: 5  - EGD; Future    2  Fatty liver due to alcoholism  Increased ALT  Serologic work-up negative  A0/F0  Improved with absence of alcohol  -Follow for now  -Still needs hepatitis A and hepatitis B vaccinations    3  Colon cancer screening  Last colonoscopy January 2015  Due for follow-up 2025      Followup Appointment: 6 months  ______________________________________________________________________    Chief Complaint   Patient presents with    follow up     HPI: The patient is seen for her follow-up office visit  She has a history of fatty liver probably secondary to alcohol use  She had an extensive work-up last year that was negative except for mildly increased ferritin  HFE gene was negative  Her LFTs have normalized with absence of alcohol  Overall she feels well  She does report that over the last 4 to 6-month she is having increasing issues with GERD and acid reflux disease  She denies any dysphagia, odynophagia, early satiety  She initially tried over-the-counter Pepcid which did not help  She took a 14-day course of over-the-counter Prilosec which did resolve her symptoms but she had recurrent reflux once she discontinued that medication    She has now taken 2 extra 14-day courses of Prilosec which resolved her symptoms but they recur when she discontinues it  She is apprehensive about the PPI since she thinks she was on Prilosec when she had pancreatitis in the distant past   She denies any change in medications or diet around the time the reflux started her bowels are normal   Her weight is stable  She denies any GI bleeding      Historical Information   Past Medical History:   Diagnosis Date    Fatty liver     LGSIL on Pap smear of cervix 2013    colposcopy/biopsy, returned to normal     Past Surgical History:   Procedure Laterality Date    COLONOSCOPY      FACIAL FRACTURE SURGERY      FEMUR FRACTURE SURGERY      MAMMO (HISTORICAL)  11/30/2020    [facility] BI-RADS 1 (after U/S) Scattered areas fibroglandular density 25% to 50 % glandular breat tissue    UPPER GASTROINTESTINAL ENDOSCOPY       Social History     Substance and Sexual Activity   Alcohol Use Yes    Comment: Socially     Social History     Substance and Sexual Activity   Drug Use Never     Social History     Tobacco Use   Smoking Status Light Smoker    Types: Cigars   Smokeless Tobacco Never     Family History   Problem Relation Age of Onset    Heart murmur Mother     Cancer Father     No Known Problems Daughter     No Known Problems Son     No Known Problems Son     Heart attack Maternal Grandmother     Diabetes Maternal Grandmother     Heart attack Maternal Grandfather     No Known Problems Paternal Grandmother     Alzheimer's disease Paternal Grandfather     Breast cancer Neg Hx     Ovarian cancer Neg Hx     Uterine cancer Neg Hx     Colon cancer Neg Hx     Colon polyps Neg Hx          Current Outpatient Medications:     calcium carbonate (TUMS) 500 mg chewable tablet    cholecalciferol (VITAMIN D3) 400 units tablet    estradiol (ESTRACE) 0 1 mg/g vaginal cream    famotidine (PEPCID) 40 MG tablet    omeprazole (PriLOSEC) 20 mg delayed release capsule    Turmeric 400 MG CAPS    multivitamin (THERAGRAN) TABS  No Known Allergies  Reviewed medications "and allergies and updated as indicated    PHYSICAL EXAM:    Blood pressure 120/64, height 5' 6 25\" (1 683 m), weight 62 1 kg (137 lb), not currently breastfeeding  Body mass index is 21 95 kg/m²  General Appearance: NAD, cooperative, alert  Eyes: Anicteric, PERRLA, EOMI  ENT:  Normocephalic, atraumatic, normal mucosa  Neck:  Supple, symmetrical, trachea midline  Resp:  Clear to auscultation bilaterally; no rales, rhonchi or wheezing; respirations unlabored   CV:  S1 S2, Regular rate and rhythm; no murmur, rub, or gallop  GI:  Soft, non-tender, non-distended; normal bowel sounds; no masses, no organomegaly   Rectal: Deferred  Musculoskeletal: No cyanosis, clubbing or edema  Normal ROM  Skin:  No jaundice, rashes, or lesions   Heme/Lymph: No palpable cervical lymphadenopathy  Psych: Normal affect, good eye contact  Neuro: No gross deficits, AAOx3    Lab Results:   Lab Results   Component Value Date    WBC 4 6 02/10/2023    HGB 13 1 02/10/2023    HCT 38 8 02/10/2023    MCV 94 02/10/2023     02/10/2023     Lab Results   Component Value Date    K 4 6 01/21/2022    CL 95 (L) 01/21/2022    CO2 21 01/21/2022    BUN 7 01/21/2022    CREATININE 0 61 01/21/2022    AST 25 02/10/2023    ALT 20 02/10/2023     Lab Results   Component Value Date    IRON 93 02/10/2023    TIBC 304 02/10/2023    FERRITIN 368 (H) 02/10/2023        Radiology Results:   No results found    "

## 2023-04-28 ENCOUNTER — ANESTHESIA EVENT (OUTPATIENT)
Dept: ANESTHESIOLOGY | Facility: AMBULATORY SURGERY CENTER | Age: 58
End: 2023-04-28

## 2023-04-28 ENCOUNTER — ANESTHESIA (OUTPATIENT)
Dept: ANESTHESIOLOGY | Facility: AMBULATORY SURGERY CENTER | Age: 58
End: 2023-04-28

## 2023-04-28 ENCOUNTER — ANESTHESIA EVENT (OUTPATIENT)
Dept: GASTROENTEROLOGY | Facility: AMBULATORY SURGERY CENTER | Age: 58
End: 2023-04-28

## 2023-04-28 ENCOUNTER — HOSPITAL ENCOUNTER (OUTPATIENT)
Dept: GASTROENTEROLOGY | Facility: AMBULATORY SURGERY CENTER | Age: 58
Discharge: HOME | End: 2023-04-28

## 2023-04-28 ENCOUNTER — ANESTHESIA (OUTPATIENT)
Dept: GASTROENTEROLOGY | Facility: AMBULATORY SURGERY CENTER | Age: 58
End: 2023-04-28

## 2023-04-28 VITALS
BODY MASS INDEX: 21.69 KG/M2 | SYSTOLIC BLOOD PRESSURE: 151 MMHG | HEIGHT: 66 IN | TEMPERATURE: 98.2 F | HEART RATE: 76 BPM | WEIGHT: 135 LBS | OXYGEN SATURATION: 96 % | RESPIRATION RATE: 18 BRPM | DIASTOLIC BLOOD PRESSURE: 91 MMHG

## 2023-04-28 DIAGNOSIS — K21.9 GASTROESOPHAGEAL REFLUX DISEASE, UNSPECIFIED WHETHER ESOPHAGITIS PRESENT: ICD-10-CM

## 2023-04-28 RX ORDER — SODIUM CHLORIDE, SODIUM LACTATE, POTASSIUM CHLORIDE, CALCIUM CHLORIDE 600; 310; 30; 20 MG/100ML; MG/100ML; MG/100ML; MG/100ML
50 INJECTION, SOLUTION INTRAVENOUS CONTINUOUS
Status: DISCONTINUED | OUTPATIENT
Start: 2023-04-28 | End: 2023-05-02 | Stop reason: HOSPADM

## 2023-04-28 RX ORDER — LIDOCAINE HYDROCHLORIDE 20 MG/ML
INJECTION, SOLUTION EPIDURAL; INFILTRATION; INTRACAUDAL; PERINEURAL AS NEEDED
Status: DISCONTINUED | OUTPATIENT
Start: 2023-04-28 | End: 2023-04-28

## 2023-04-28 RX ORDER — PROPOFOL 10 MG/ML
INJECTION, EMULSION INTRAVENOUS AS NEEDED
Status: DISCONTINUED | OUTPATIENT
Start: 2023-04-28 | End: 2023-04-28

## 2023-04-28 RX ADMIN — SODIUM CHLORIDE, SODIUM LACTATE, POTASSIUM CHLORIDE, CALCIUM CHLORIDE 50 ML/HR: 600; 310; 30; 20 INJECTION, SOLUTION INTRAVENOUS at 14:20

## 2023-04-28 RX ADMIN — LIDOCAINE HYDROCHLORIDE 50 MG: 20 INJECTION, SOLUTION EPIDURAL; INFILTRATION; INTRACAUDAL; PERINEURAL at 14:52

## 2023-04-28 RX ADMIN — PROPOFOL 50 MG: 10 INJECTION, EMULSION INTRAVENOUS at 14:53

## 2023-04-28 RX ADMIN — PROPOFOL 100 MG: 10 INJECTION, EMULSION INTRAVENOUS at 14:52

## 2023-04-28 NOTE — INTERVAL H&P NOTE
H&P reviewed  After examining the patient I find no changes in the patients condition since the H&P had been written      Vitals:    04/28/23 1410   BP: 145/84   Pulse: 74   Resp: 18   Temp: 98 2 °F (36 8 °C)   SpO2: 98%

## 2023-04-28 NOTE — ANESTHESIA POSTPROCEDURE EVALUATION
Post-Op Assessment Note    CV Status:  Stable  Pain Score: 0    Pain management: adequate     Mental Status:  Alert and awake   Hydration Status:  Euvolemic   PONV Controlled:  Controlled   Airway Patency:  Patent      Post Op Vitals Reviewed: Yes      Staff: Anesthesiologist         No notable events documented      BP   132/86   Temp   97 7   Pulse  75   Resp   18   SpO2   98

## 2023-04-28 NOTE — ANESTHESIA PREPROCEDURE EVALUATION
Procedure:  PRE-OP ONLY    Relevant Problems   GI/HEPATIC   (+) Fatty liver due to alcoholism   (+) GERD (gastroesophageal reflux disease)

## 2023-04-28 NOTE — ANESTHESIA PREPROCEDURE EVALUATION
Procedure:  EGD    Relevant Problems   ANESTHESIA (within normal limits)      CARDIO (within normal limits)      ENDO (within normal limits)      GI/HEPATIC   (+) Fatty liver due to alcoholism   (+) GERD (gastroesophageal reflux disease)      /RENAL (within normal limits)      GYN (within normal limits)      HEMATOLOGY (within normal limits)      MUSCULOSKELETAL (within normal limits)      NEURO/PSYCH (within normal limits)      PULMONARY (within normal limits)     Recent labs personally reviewed:  Lab Results   Component Value Date    WBC 4 6 02/10/2023    HGB 13 1 02/10/2023     02/10/2023     Lab Results   Component Value Date    K 4 6 01/21/2022    BUN 7 01/21/2022    CREATININE 0 61 01/21/2022     No results found for: PTT   Lab Results   Component Value Date    INR 1 0 01/21/2022       Lab Results   Component Value Date    HGBA1C 5 3 08/31/2021              Physical Exam    Airway    Mallampati score: II  TM Distance: >3 FB  Neck ROM: full     Dental   No notable dental hx     Cardiovascular      Pulmonary      Other Findings        Anesthesia Plan  ASA Score- 2     Anesthesia Type- IV sedation with anesthesia with ASA Monitors  Additional Monitors:   Airway Plan:     Comment: Supplemental O2, etco2 monitoring    Plan Factors-Exercise tolerance (METS): >4 METS  Chart reviewed  Patient summary reviewed  Patient is a current smoker  Induction- intravenous  Postoperative Plan-     Informed Consent- Anesthetic plan and risks discussed with patient  I personally reviewed this patient with the CRNA  Discussed and agreed on the Anesthesia Plan with the CRNA  [place]

## 2023-05-06 DIAGNOSIS — K21.9 GASTROESOPHAGEAL REFLUX DISEASE, UNSPECIFIED WHETHER ESOPHAGITIS PRESENT: ICD-10-CM

## 2023-05-06 RX ORDER — FAMOTIDINE 40 MG/1
TABLET, FILM COATED ORAL
Qty: 90 TABLET | Refills: 2 | Status: SHIPPED | OUTPATIENT
Start: 2023-05-06

## 2023-05-09 NOTE — RESULT ENCOUNTER NOTE
EGD biopsies essentially negative left voicemail for the patient  Routine office follow-up  No EGD recall  I do not believe this represents [facility]

## 2023-05-19 ENCOUNTER — TELEPHONE (OUTPATIENT)
Dept: GASTROENTEROLOGY | Facility: CLINIC | Age: 58
End: 2023-05-19

## 2023-05-19 DIAGNOSIS — K21.9 GASTROESOPHAGEAL REFLUX DISEASE WITHOUT ESOPHAGITIS: Primary | ICD-10-CM

## 2023-05-19 RX ORDER — OMEPRAZOLE 20 MG/1
20 CAPSULE, DELAYED RELEASE ORAL DAILY
Qty: 90 CAPSULE | Refills: 3 | Status: SHIPPED | OUTPATIENT
Start: 2023-05-19

## 2023-05-19 NOTE — TELEPHONE ENCOUNTER
Patients GI provider:  Dr Martin Lowe    Number to return call: [phone]    Reason for call: Pt sates that doctor told her he would send in an RX for Omeprazole  Right now she takes OTC but rx would be cheaper    Please return her call to discuss     Scheduled procedure/appointment date if applicable: Apt/procedure 4/28/23

## 2023-05-19 NOTE — TELEPHONE ENCOUNTER
I spoke with patient, will send omeprazole 20 mg daily 90 day supply to [facility] in [place], she will apply a [employer] coupon

## 2023-06-13 PROBLEM — Z12.11 COLON CANCER SCREENING: Status: RESOLVED | Noted: 2022-01-13 | Resolved: 2023-06-13

## 2023-06-22 ENCOUNTER — TELEPHONE (OUTPATIENT)
Dept: OBGYN CLINIC | Facility: CLINIC | Age: 58
End: 2023-06-22

## 2023-06-22 DIAGNOSIS — N95.2 VAGINAL ATROPHY: ICD-10-CM

## 2023-06-22 RX ORDER — ESTRADIOL 0.1 MG/G
1 CREAM VAGINAL 3 TIMES WEEKLY
Qty: 42.5 G | Refills: 2 | Status: SHIPPED | OUTPATIENT
Start: 2023-06-23

## 2023-06-22 NOTE — TELEPHONE ENCOUNTER
Pt called in requesting for refill on estradiol  L/m on answering machine  Pt confirmed pharmacy is [facility] in [place]  Last time medication was sent to [facility] however pt states that she can use coupons if they go to [facility]   Dr Ethan Walters please advise in abscense of Dr Liz Fletcher 21-Jun-2021

## 2024-01-31 ENCOUNTER — ANNUAL EXAM (OUTPATIENT)
Dept: OBGYN CLINIC | Facility: CLINIC | Age: 59
End: 2024-01-31
Payer: COMMERCIAL

## 2024-01-31 VITALS
WEIGHT: 141.8 LBS | HEIGHT: 66 IN | BODY MASS INDEX: 22.79 KG/M2 | SYSTOLIC BLOOD PRESSURE: 104 MMHG | DIASTOLIC BLOOD PRESSURE: 60 MMHG

## 2024-01-31 DIAGNOSIS — Z12.31 BREAST CANCER SCREENING BY MAMMOGRAM: ICD-10-CM

## 2024-01-31 DIAGNOSIS — Z01.419 ENCOUNTER FOR ANNUAL ROUTINE GYNECOLOGICAL EXAMINATION: Primary | ICD-10-CM

## 2024-01-31 DIAGNOSIS — N95.2 VAGINAL ATROPHY: ICD-10-CM

## 2024-01-31 DIAGNOSIS — Z12.11 SCREEN FOR COLON CANCER: ICD-10-CM

## 2024-01-31 PROCEDURE — 99396 PREV VISIT EST AGE 40-64: CPT | Performed by: STUDENT IN AN ORGANIZED HEALTH CARE EDUCATION/TRAINING PROGRAM

## 2024-01-31 RX ORDER — OFLOXACIN 3 MG/ML
SOLUTION/ DROPS OPHTHALMIC
COMMUNITY
Start: 2024-01-29

## 2024-01-31 RX ORDER — ERYTHROMYCIN 5 MG/G
OINTMENT OPHTHALMIC
COMMUNITY
Start: 2024-01-02

## 2024-01-31 RX ORDER — ESTRADIOL 0.1 MG/G
1 CREAM VAGINAL 3 TIMES WEEKLY
Qty: 42.5 G | Refills: 2 | Status: SHIPPED | OUTPATIENT
Start: 2024-01-31

## 2024-01-31 NOTE — ASSESSMENT & PLAN NOTE
- Symptoms stable with vaginal estradiol cream every-other week. Will continue. Refill provided today.

## 2024-01-31 NOTE — PROGRESS NOTES
[facility] [address], [address], [place] [address]    ASSESSMENT/PLAN: Lincoln Olea is a 58 y.o.  who presents for annual gynecologic exam.    Encounter for routine gynecologic examination  - Routine well woman exam completed today.  - Cervical Cancer Screening: Current ASCCP Guidelines reviewed. Last Pap: 2022. History of abnormal: Yes - URBANO-1 in , returned to normal.  - STI screening offered including HIV testing: offered, pt declined  - Breast Cancer Screening: Last Mammogram 2024 (ordered by PCP, result not on file), result normal per patient. Repeat ordered today.   - Colorectal cancer screening was ordered.  - The following were reviewed in today's visit: breast self exam, mammography screening ordered, exercise, and healthy diet    Additional problems addressed during this visit:  1. Encounter for annual routine gynecological examination    2. Breast cancer screening by mammogram  -     Mammo screening bilateral w 3d & cad; Future    3. Vaginal atrophy  Assessment & Plan:  - Symptoms stable with vaginal estradiol cream every-other week. Will continue. Refill provided today.    Orders:  -     estradiol (ESTRACE) 0.1 mg/g vaginal cream; Insert 1 g into the vagina 3 (three) times a week Use lowest frequency necessary to alleviate symptoms    4. Screen for colon cancer  -     Ambulatory Referral to Gastroenterology; Future        CC:  Annual Gynecologic Examination    HPI: Lincoln Olea is a 58 y.o.  who presents for annual gynecologic examination. She is without complaint today.     ROS: Negative except as noted in HPI    No LMP recorded. Patient is postmenopausal.       She  reports being sexually active and has had partner(s) who are male. She reports using the following method of birth control/protection: Post-menopausal.       The following portions of the patient's history were reviewed and updated as appropriate:  Past Medical History:   Diagnosis  Date    Fatty liver     LGSIL on Pap smear of cervix 2013    colposcopy/biopsy, returned to normal     Past Surgical History:   Procedure Laterality Date    COLONOSCOPY      FACIAL FRACTURE SURGERY      FEMUR FRACTURE SURGERY      MAMMO (HISTORICAL)  11/30/2020    [facility] BI-RADS 1 (after U/S) Scattered areas fibroglandular density 25% to 50 % glandular breat tissue    UPPER GASTROINTESTINAL ENDOSCOPY       Family History   Problem Relation Age of Onset    Heart murmur Mother     Cancer Father     No Known Problems Daughter     No Known Problems Son     No Known Problems Son     Heart attack Maternal Grandmother     Diabetes Maternal Grandmother     Heart attack Maternal Grandfather     No Known Problems Paternal Grandmother     Alzheimer's disease Paternal Grandfather     Breast cancer Neg Hx     Ovarian cancer Neg Hx     Uterine cancer Neg Hx     Colon cancer Neg Hx     Colon polyps Neg Hx      Social History     Socioeconomic History    Marital status: Single     Spouse name: None    Number of children: None    Years of education: None    Highest education level: None   Occupational History    None   Tobacco Use    Smoking status: Some Days     Types: Cigars    Smokeless tobacco: Never   Vaping Use    Vaping status: Never Used   Substance and Sexual Activity    Alcohol use: Yes     Comment: Socially    Drug use: Never    Sexual activity: Yes     Partners: Male     Birth control/protection: Post-menopausal     Comment: no new partner in past year   Other Topics Concern    None   Social History Narrative    Exercise: 2-3 times a week    Domestic violence: No    currently sexually active, no new partner in last year     Social Determinants of Health     Financial Resource Strain: Not on file   Food Insecurity: Not on file   Transportation Needs: Not on file   Physical Activity: Not on file   Stress: Not on file   Social Connections: Not on file   Intimate Partner Violence: Not on file   Housing Stability: Not on file  "    Outpatient Medications Marked as Taking for the 1/31/24 encounter (Annual Exam) with Matty Barroso MD   Medication    Calcium Carbonate (CALCIUM 500 PO)    calcium carbonate (TUMS) 500 mg chewable tablet    cholecalciferol (VITAMIN D3) 400 units tablet    erythromycin (ILOTYCIN) ophthalmic ointment    estradiol (ESTRACE) 0.1 mg/g vaginal cream    famotidine (PEPCID) 40 MG tablet    ofloxacin (OCUFLOX) 0.3 % ophthalmic solution    omeprazole (PriLOSEC) 20 mg delayed release capsule    Turmeric 400 MG CAPS    [DISCONTINUED] estradiol (ESTRACE) 0.1 mg/g vaginal cream     No Known Allergies        Objective:  /60 (BP Location: Left arm, Patient Position: Sitting, Cuff Size: Standard)   Ht 5' 6.25\" (1.683 m)   Wt 64.3 kg (141 lb 12.8 oz)   BMI 22.71 kg/m²        Chaperone present? Yes: Laverne Steward MA.    General Appearance: alert and oriented, in no acute distress.   Neck/Thyroid: No thyromegaly, no thyroid nodules.  Respiratory: Unlabored breathing.  Cardiovascular: Regular rate, no peripheral edema.  Abdomen: Soft, non-tender, non-distended, no masses, no rebound or guarding.  Breast Exam: No dimpling, nipple retraction or discharge. No lumps or masses. No axillary or supraclavicular nodes.   Pelvic:       External genitalia: Normal appearance, no abnormal pigmentation, no lesions or masses. Normal [place]'s and [place]'s.      Urinary system: Urethral meatus normal, bladder non-tender.      Vaginal: atrophic mucosa without prolapse or lesions. Normal-appearing physiologic discharge.      Cervix: Normal-appearing, well-epithelialized, no gross lesions or masses. No cervical motion tenderness.      Adnexa: No adnexal masses or tenderness noted.      Uterus: Normal-sized, regular contour, midline, mobile, no uterine tenderness.  Extremities: Normal range of motion. Warm, well-perfused, non-tender.   Skin: normal, no rash or abnormalities  Neurologic: alert, oriented x3  Psychiatric: Appropriate affect, " mood stable, cooperative with exam.    Matty Barroso MD  1/31/2024 5:51 PM

## 2024-03-07 ENCOUNTER — TELEPHONE (OUTPATIENT)
Age: 59
End: 2024-03-07

## 2024-03-07 ENCOUNTER — PREP FOR PROCEDURE (OUTPATIENT)
Age: 59
End: 2024-03-07

## 2024-03-07 DIAGNOSIS — Z12.11 SCREENING FOR COLON CANCER: Primary | ICD-10-CM

## 2024-03-07 NOTE — TELEPHONE ENCOUNTER
03/07/24  Screened by: Ashley Bello    Referring Provider Dr. Barroso    Pre- Screening:     There is no height or weight on file to calculate BMI. 22.71  Has patient been referred for a routine screening Colonoscopy? yes  Is the patient between 45-75 years old? yes      Previous Colonoscopy yes   If yes:    Date: 2010    Facility:     Reason:           Does the patient want to see a Gastroenterologist prior to their procedure OR are they having any GI symptoms? no    Has the patient been hospitalized or had abdominal surgery in the past 6 months? no    Does the patient use supplemental oxygen? no    Does the patient take Coumadin, Lovenox, Plavix, Elliquis, Xarelto, or other blood thinning medication? no    Has the patient had a stroke, cardiac event, or stent placed in the past year? no    SCHEDULING STAFF: If patient answers NO to above questions, then schedule procedure. If patient answers YES to above questions, then schedule office appointment.     If patient is between 45yrs - 49yrs, please advise patient that we will have to confirm benefits & coverage with their insurance company for a routine screening colonoscopy.

## 2024-03-19 ENCOUNTER — TELEPHONE (OUTPATIENT)
Dept: GASTROENTEROLOGY | Facility: CLINIC | Age: 59
End: 2024-03-19

## 2024-03-19 NOTE — TELEPHONE ENCOUNTER
Scheduled date of COLON(as of today):5/3/2024  Physician performing COLON:PUJA  Location of COLON:[facility]  Instructions reviewed with patient by:CONFIRMED W/PT WAS EMAILED MIRALAX GATORADE NOT DOCUMENTED WHEN PT SCHEDULED  Clearances:

## 2024-04-02 ENCOUNTER — NURSE TRIAGE (OUTPATIENT)
Age: 59
End: 2024-04-02

## 2024-04-02 NOTE — TELEPHONE ENCOUNTER
"Patient calling in reporting a dull ache that is radiating from her back to her breast that is also radiating to her nipple.  Patient is not sure if it is from swimming and exercising or if there is something more going on.  She states she had the same type of pain at her annual appointment on 1/31/24.  Offered to schedule appointment for patient to be further evaluated and she declines at this time.   Patient was advised to send in her mammogram that she completed from her PCP and fax to office.  Office fax number given.  Once mammogram results are received, please review mammogram results and advise for any further recommendations for patient.  Clerical-can you look for patient's mammogram fax and place in chart once received? Thank you!     Reason for Disposition   Screening tests for breast cancer, questions about    Answer Assessment - Initial Assessment Questions  1. SYMPTOM: \"What's the main symptom you're concerned about?\"  (e.g., lump, pain, rash, nipple discharge)      Dull aching breast pain   2. LOCATION: \"Where is the dull ache located?\"      Left side of breast   3. ONSET: \"When did ache  start?\"      Last July   4. PRIOR HISTORY: \"Do you have any history of prior problems with your breasts?\" (e.g., lumps, cancer, fibrocystic breast disease)      Denies   5. CAUSE: \"What do you think is causing this symptom?\"      Patient states she exercises and there is an ache that radiates from her back to her breast   6. OTHER SYMPTOMS: \"Do you have any other symptoms?\" (e.g., fever, breast pain, redness or rash, nipple discharge)      Denies   7. PREGNANCY-BREASTFEEDING: \"Is there any chance you are pregnant?\" \"When was your last menstrual period?\" \"Are you breastfeeding?\"      N/A    Protocols used: Breast Symptoms-ADULT-[place]    "

## 2024-04-12 ENCOUNTER — TELEPHONE (OUTPATIENT)
Age: 59
End: 2024-04-12

## 2024-04-12 NOTE — TELEPHONE ENCOUNTER
[email or web address] emailed to      COLONOSCOPY  MIRALAX/Dulcolax Bowel Preparation Instructions    The OR/GI Lab will contact you the evening prior to your procedure with your exact arrival time.    Our practice requires a 1 week notice for any cancellations or rescheduling. We kindly ask that you immediately notify us of any changes including any new medications that are prescribed. Thank you for your cooperation.     WEEK BEFORE YOUR PROCEDURE:  Stop taking Iron tablets.  5 days prior, AVOID vegetables and fruits with skins or seeds, nuts, corn, popcorn and whole grain breads.   Purchase: One (1) 238-gram container of Miralax (polyethylene glycol 3350), four (4) 5 mg Dulcolax (bisacodyl) tablets, and one (1) 64-ounce bottle of Gatorade (sports drink) - no red, orange, or purple. These may be purchased at any pharmacy without a prescription. Generic products are permissible.   Arrange responsible transportation for day of the procedure.     DAY BEFORE THE PROCEDURE:   CLEAR liquids only for entire day prior. Nothing red, orange or purple.    You MAY have:                                                               Soda  Water  Broth Gatorade  Jello  Popsicles Coffee/tea without milk/creamer     YOU MAY NOT HAVE:  Solid foods   Milk and milk products    Juice with pulp    BOWEL PREPARATION:  Includes: One (1) 238-gram container of Miralax (polyethylene glycol 3350), four (4) 5 mg Dulcolax (bisacodyl) tablets, and one (1) 64-ounce bottle of Gatorade (sports drink).  Preparation may be refrigerated.  Entire bowel prep should be completed.     Afternoon before the procedure (2:00 pm - 5:00 pm):    Take two (2) 5 mg Dulcolax laxative tablets.     Evening before the procedure (6:00 pm):  Mix entire container of Miralax with one (1) 64-ounce bottle of Gatorade and shake until all medication is dissolved.   Begin drinking solution. Drink an eight (8) ounce cup every 10-15 minutes until you have consumed half (32  ounces) of the solution.  Refrigerate remaining solution.    Night before the procedure (8:00 pm):  Take two (2) 5 mg Dulcolax laxative tablets.     Beginning 5 hours before your procedure:  Drink the remaining amount of prepared solution (32 ounces).  Drink an eight (8) ounce cup every 10-15 minutes until you have consumed the remaining solution.     Bowel prep should be completed 4 hours prior to procedure time.    NOTHING TO EAT OR DRINK AFTER MIDNIGHT- EXCEPT FOR YOUR PREP    DAY OF THE PROCEDURE:  You may brush your teeth.  Leave all jewelry at home.  Please arrive for your procedure as indicated by the OR / GI Lab / Endoscopy Unit. The hospital will contact you the day before with your exact arrival time.   Make sure you have arranged ahead of time for a responsible adult (18 or older) to accompany and drive you home after the procedure.  Please discuss any transportation concerns with our staff prior to your procedure.    The effects of the anesthesia can persist for 24 hours.  After receiving the sedation, you must exercise caution before engaging in any activity that could harm yourself and others (such as driving a car).  Do not make any important decisions or do not drink any alcoholic beverages during this time period.  After your procedure, you may have anything you'd like to eat or drink.  You will probably want to start with something light.  Please include plenty of fluids.  Avoid items that cause gas such as sodas and salads.    SPECIAL INSTRUCTIONS:    For patients currently taking blood thinners and/or antiplatelet therapy our office will contact the prescribing provider.  Our office will contact you with any required changes to your medication regimen.     Blood thinner (i.e. - Coumadin, Pradaxa, Lovenox, Xarelto, Eliquis)  ?  Continue (Do Not Stop)  ? Stop______________for_____________days prior to the procedure.    Antiplatelet (i.e. - Plavix, Aggrenox, Effient, Brilinta)  ?  Continue (Do Not  Stop)  ? Stop______________for_____________days prior to the procedure.       Diabetes:   If you are Diabetic, please see separate Diabetic Instruction Sheet.          Prescribed medications:  Do not stop your aspirin, or any of your other medications (unless instructed otherwise).    Take the rest of your prescribed medications with small sips of water at least 2 hours prior to your procedure.      For any questions or concerns related to your bowel preparation or pre-procedure instructions, please contact our office at [phone].  Thank you for choosing [facility]!

## 2024-04-25 ENCOUNTER — TELEPHONE (OUTPATIENT)
Dept: GASTROENTEROLOGY | Facility: CLINIC | Age: 59
End: 2024-04-25

## 2024-04-25 DIAGNOSIS — K21.9 GASTROESOPHAGEAL REFLUX DISEASE WITHOUT ESOPHAGITIS: ICD-10-CM

## 2024-04-25 RX ORDER — OMEPRAZOLE 20 MG/1
20 CAPSULE, DELAYED RELEASE ORAL DAILY
Qty: 90 CAPSULE | Refills: 3 | Status: SHIPPED | OUTPATIENT
Start: 2024-04-25

## 2024-05-03 ENCOUNTER — ANESTHESIA EVENT (OUTPATIENT)
Dept: GASTROENTEROLOGY | Facility: HOSPITAL | Age: 59
End: 2024-05-03

## 2024-05-03 ENCOUNTER — ANESTHESIA (OUTPATIENT)
Dept: GASTROENTEROLOGY | Facility: HOSPITAL | Age: 59
End: 2024-05-03

## 2024-05-03 ENCOUNTER — HOSPITAL ENCOUNTER (OUTPATIENT)
Dept: GASTROENTEROLOGY | Facility: HOSPITAL | Age: 59
Setting detail: OUTPATIENT SURGERY
Discharge: HOME | End: 2024-05-03
Attending: INTERNAL MEDICINE
Payer: COMMERCIAL

## 2024-05-03 VITALS
OXYGEN SATURATION: 100 % | RESPIRATION RATE: 20 BRPM | TEMPERATURE: 98.3 F | SYSTOLIC BLOOD PRESSURE: 140 MMHG | DIASTOLIC BLOOD PRESSURE: 75 MMHG | HEART RATE: 69 BPM

## 2024-05-03 DIAGNOSIS — Z12.11 SCREENING FOR COLON CANCER: ICD-10-CM

## 2024-05-03 PROCEDURE — G0121 COLON CA SCRN NOT HI RSK IND: HCPCS | Performed by: INTERNAL MEDICINE

## 2024-05-03 RX ORDER — SODIUM CHLORIDE, SODIUM LACTATE, POTASSIUM CHLORIDE, CALCIUM CHLORIDE 600; 310; 30; 20 MG/100ML; MG/100ML; MG/100ML; MG/100ML
INJECTION, SOLUTION INTRAVENOUS CONTINUOUS PRN
Status: DISCONTINUED | OUTPATIENT
Start: 2024-05-03 | End: 2024-05-03

## 2024-05-03 RX ORDER — PROPOFOL 10 MG/ML
INJECTION, EMULSION INTRAVENOUS AS NEEDED
Status: DISCONTINUED | OUTPATIENT
Start: 2024-05-03 | End: 2024-05-03

## 2024-05-03 RX ADMIN — SODIUM CHLORIDE, SODIUM LACTATE, POTASSIUM CHLORIDE, AND CALCIUM CHLORIDE: .6; .31; .03; .02 INJECTION, SOLUTION INTRAVENOUS at 08:17

## 2024-05-03 RX ADMIN — PROPOFOL 50 MG: 10 INJECTION, EMULSION INTRAVENOUS at 08:38

## 2024-05-03 RX ADMIN — PROPOFOL 50 MG: 10 INJECTION, EMULSION INTRAVENOUS at 08:41

## 2024-05-03 RX ADMIN — PROPOFOL 200 MG: 10 INJECTION, EMULSION INTRAVENOUS at 08:31

## 2024-05-03 RX ADMIN — PROPOFOL 50 MG: 10 INJECTION, EMULSION INTRAVENOUS at 08:34

## 2024-05-03 NOTE — ANESTHESIA POSTPROCEDURE EVALUATION
Post-Op Assessment Note    CV Status:  Stable  Pain Score: 0    Pain management: adequate       Mental Status:  Alert and awake   Hydration Status:  Euvolemic and stable   PONV Controlled:  None   Airway Patency:  Patent     Post Op Vitals Reviewed: Yes    No anethesia notable event occurred.    Staff: CRNA               /69 (05/03/24 0849)    Temp      Pulse 68 (05/03/24 0849)   Resp 18 (05/03/24 0849)    SpO2 99 % (05/03/24 0849)

## 2024-05-03 NOTE — ANESTHESIA PREPROCEDURE EVALUATION
Procedure:  COLONOSCOPY    Relevant Problems   GI/HEPATIC   (+) Fatty liver due to alcoholism   (+) GERD (gastroesophageal reflux disease)        Physical Exam    Airway    Mallampati score: II  TM Distance: <3 FB  Neck ROM: full     Dental   Comment: None loose, No notable dental hx     Cardiovascular      Pulmonary      Other Findings  post-pubertal.      Anesthesia Plan  ASA Score- 2     Anesthesia Type- IV sedation with anesthesia with ASA Monitors.         Additional Monitors:     Airway Plan:     Comment: Last of PO bowel prep: 03:30.       Plan Factors-Exercise tolerance (METS): >4 METS.    Chart reviewed.    Patient summary reviewed.    Patient is a current smoker.  Patient instructed to abstain from smoking on day of procedure. Patient did not smoke on day of surgery.            Induction- intravenous.    Postoperative Plan-     Informed Consent- Anesthetic plan and risks discussed with patient.  I personally reviewed this patient with the CRNA. Discussed and agreed on the Anesthesia Plan with the CRNA..

## 2024-05-03 NOTE — H&P
History and Physical -  [facility]  Lincoln Olea 58 y.o. female MRN: [id number]    HPI: Lincoln Olea is a 58 y.o. year old female who presents for screening colonoscopy    REVIEW OF SYSTEMS: Per the HPI, and otherwise unremarkable.    Historical Information   Past Medical History:   Diagnosis Date    Fatty liver     LGSIL on Pap smear of cervix 2013    colposcopy/biopsy, returned to normal     Past Surgical History:   Procedure Laterality Date    COLONOSCOPY      FACIAL FRACTURE SURGERY      FEMUR FRACTURE SURGERY      MAMMO (HISTORICAL)  11/30/2020    [facility] BI-RADS 1 (after U/S) Scattered areas fibroglandular density 25% to 50 % glandular breat tissue    UPPER GASTROINTESTINAL ENDOSCOPY       Social History   Social History     Substance and Sexual Activity   Alcohol Use Yes    Comment: Socially     Social History     Substance and Sexual Activity   Drug Use Never     Social History     Tobacco Use   Smoking Status Some Days    Types: Cigars   Smokeless Tobacco Never     Family History   Problem Relation Age of Onset    Heart murmur Mother     Cancer Father     No Known Problems Daughter     No Known Problems Son     No Known Problems Son     Heart attack Maternal Grandmother     Diabetes Maternal Grandmother     Heart attack Maternal Grandfather     No Known Problems Paternal Grandmother     Alzheimer's disease Paternal Grandfather     Breast cancer Neg Hx     Ovarian cancer Neg Hx     Uterine cancer Neg Hx     Colon cancer Neg Hx     Colon polyps Neg Hx        Meds/Allergies       Current Outpatient Medications:     cholecalciferol (VITAMIN D3) 400 units tablet    estradiol (ESTRACE) 0.1 mg/g vaginal cream    omeprazole (PriLOSEC) 20 mg delayed release capsule    Turmeric 400 MG CAPS    Calcium Carbonate (CALCIUM 500 PO)    calcium carbonate (TUMS) 500 mg chewable tablet    erythromycin (ILOTYCIN) ophthalmic ointment    No Known Allergies    Objective     /77   Pulse 75   Temp 98.3 °F  (36.8 °C) (Temporal)   Resp 16   SpO2 99%     PHYSICAL EXAM    Gen: NAD AAOx3  Head: Normocephalic, Atraumatic  CV: S1S2 RRR no m/r/g  CHEST: Clear b/l no c/r/w  ABD: soft, +BS NT/ND  EXT: no edema    ASSESSMENT/PLAN:  This is a 58 y.o. year old female here for average risk screening colonoscopy, and she is stable and optimized for her procedure.

## 2024-05-28 ENCOUNTER — OFFICE VISIT (OUTPATIENT)
Dept: URGENT CARE | Facility: CLINIC | Age: 59
End: 2024-05-28
Payer: COMMERCIAL

## 2024-05-28 VITALS
TEMPERATURE: 97.6 F | HEART RATE: 90 BPM | WEIGHT: 148 LBS | RESPIRATION RATE: 18 BRPM | HEIGHT: 66 IN | DIASTOLIC BLOOD PRESSURE: 90 MMHG | BODY MASS INDEX: 23.78 KG/M2 | OXYGEN SATURATION: 99 % | SYSTOLIC BLOOD PRESSURE: 140 MMHG

## 2024-05-28 DIAGNOSIS — R11.0 NAUSEA: Primary | ICD-10-CM

## 2024-05-28 PROCEDURE — 99213 OFFICE O/P EST LOW 20 MIN: CPT | Performed by: PHYSICIAN ASSISTANT

## 2024-05-28 RX ORDER — ONDANSETRON 4 MG/1
4 TABLET, FILM COATED ORAL EVERY 8 HOURS PRN
Qty: 20 TABLET | Refills: 0 | Status: SHIPPED | OUTPATIENT
Start: 2024-05-28

## 2024-05-28 NOTE — PROGRESS NOTES
"[facility] Now        NAME: Lincoln Olea is a 58 y.o. female  : 1965    MRN: [id number]  DATE: May 28, 2024  TIME: 6:56 PM    /90   Pulse 90   Temp 97.6 °F (36.4 °C)   Resp 18   Ht 5' 6\" (1.676 m)   Wt 67.1 kg (148 lb)   SpO2 99%   BMI 23.89 kg/m²     Assessment and Plan   Nausea [R11.0]  1. Nausea  ondansetron (ZOFRAN) 4 mg tablet            Patient Instructions       Follow up with PCP in 3-5 days.  Proceed to  ER if symptoms worsen.    Chief Complaint     Chief Complaint   Patient presents with    Vomiting     Patient has has nausea for about a day. She has not eaten since last night, vomited today, she is able to keep down fluids          History of Present Illness       Pt with nausea and one episode of vomiting today no abdomen pain no chest pain no sob  no back pain     Vomiting         Review of Systems   Review of Systems   Constitutional: Negative.    HENT: Negative.     Eyes: Negative.    Respiratory: Negative.     Cardiovascular: Negative.    Gastrointestinal:  Positive for vomiting.   Endocrine: Negative.    Genitourinary: Negative.    Musculoskeletal: Negative.    Skin: Negative.    Allergic/Immunologic: Negative.    Neurological: Negative.    Hematological: Negative.    Psychiatric/Behavioral: Negative.     All other systems reviewed and are negative.        Current Medications       Current Outpatient Medications:     Calcium Carbonate (CALCIUM 500 PO), Take by mouth, Disp: , Rfl:     calcium carbonate (TUMS) 500 mg chewable tablet, Chew 1 tablet daily, Disp: , Rfl:     cholecalciferol (VITAMIN D3) 400 units tablet, Take 400 Units by mouth daily, Disp: , Rfl:     erythromycin (ILOTYCIN) ophthalmic ointment, Every 6 hours, Disp: , Rfl:     estradiol (ESTRACE) 0.1 mg/g vaginal cream, Insert 1 g into the vagina 3 (three) times a week Use lowest frequency necessary to alleviate symptoms, Disp: 42.5 g, Rfl: 2    omeprazole (PriLOSEC) 20 mg delayed release capsule, TAKE 1 " "CAPSULE BY MOUTH DAILY, Disp: 90 capsule, Rfl: 3    ondansetron (ZOFRAN) 4 mg tablet, Take 1 tablet (4 mg total) by mouth every 8 (eight) hours as needed for nausea or vomiting, Disp: 20 tablet, Rfl: 0    Turmeric 400 MG CAPS, Take by mouth, Disp: , Rfl:     Current Allergies     Allergies as of 05/28/2024    (No Known Allergies)            The following portions of the patient's history were reviewed and updated as appropriate: allergies, current medications, past family history, past medical history, past social history, past surgical history and problem list.     Past Medical History:   Diagnosis Date    Fatty liver     LGSIL on Pap smear of cervix 2013    colposcopy/biopsy, returned to normal       Past Surgical History:   Procedure Laterality Date    COLONOSCOPY      FACIAL FRACTURE SURGERY      FEMUR FRACTURE SURGERY      MAMMO (HISTORICAL)  11/30/2020    [facility] BI-RADS 1 (after [place]) Scattered areas fibroglandular density 25% to 50 % glandular breat tissue    UPPER GASTROINTESTINAL ENDOSCOPY         Family History   Problem Relation Age of Onset    Heart murmur Mother     Cancer Father     No Known Problems Daughter     No Known Problems Son     No Known Problems Son     Heart attack Maternal Grandmother     Diabetes Maternal Grandmother     Heart attack Maternal Grandfather     No Known Problems Paternal Grandmother     Alzheimer's disease Paternal Grandfather     Breast cancer Neg Hx     Ovarian cancer Neg Hx     Uterine cancer Neg Hx     Colon cancer Neg Hx     Colon polyps Neg Hx          Medications have been verified.        Objective   /90   Pulse 90   Temp 97.6 °F (36.4 °C)   Resp 18   Ht 5' 6\" (1.676 m)   Wt 67.1 kg (148 lb)   SpO2 99%   BMI 23.89 kg/m²        Physical Exam     Physical Exam  Vitals and nursing note reviewed.   Constitutional:       Appearance: Normal appearance. She is normal weight.   HENT:      Head: Normocephalic and atraumatic.      Left Ear: External ear normal. "   Eyes:      Extraocular Movements: Extraocular movements intact.   Cardiovascular:      Rate and Rhythm: Normal rate and regular rhythm.      Pulses: Normal pulses.      Heart sounds: Normal heart sounds.   Pulmonary:      Effort: Pulmonary effort is normal.      Breath sounds: Normal breath sounds.   Abdominal:      General: Bowel sounds are normal.      Palpations: Abdomen is soft.   Musculoskeletal:         General: Normal range of motion.      Cervical back: Normal range of motion and neck supple.   Skin:     General: Skin is warm.      Capillary Refill: Capillary refill takes less than 2 seconds.   Neurological:      Mental Status: She is alert.

## 2025-02-05 ENCOUNTER — ANNUAL EXAM (OUTPATIENT)
Dept: OBGYN CLINIC | Facility: CLINIC | Age: 60
End: 2025-02-05
Payer: COMMERCIAL

## 2025-02-05 VITALS
DIASTOLIC BLOOD PRESSURE: 76 MMHG | BODY MASS INDEX: 23.3 KG/M2 | SYSTOLIC BLOOD PRESSURE: 134 MMHG | WEIGHT: 145 LBS | HEIGHT: 66 IN

## 2025-02-05 DIAGNOSIS — Z12.4 SCREENING FOR CERVICAL CANCER: ICD-10-CM

## 2025-02-05 DIAGNOSIS — Z78.0 POSTMENOPAUSAL ESTROGEN DEFICIENCY: ICD-10-CM

## 2025-02-05 DIAGNOSIS — N95.2 VAGINAL ATROPHY: ICD-10-CM

## 2025-02-05 DIAGNOSIS — Z91.89 AT RISK FOR OSTEOPOROSIS: ICD-10-CM

## 2025-02-05 DIAGNOSIS — Z12.31 BREAST CANCER SCREENING BY MAMMOGRAM: ICD-10-CM

## 2025-02-05 DIAGNOSIS — Z01.419 ENCOUNTER FOR ANNUAL ROUTINE GYNECOLOGICAL EXAMINATION: Primary | ICD-10-CM

## 2025-02-05 PROCEDURE — 99396 PREV VISIT EST AGE 40-64: CPT | Performed by: STUDENT IN AN ORGANIZED HEALTH CARE EDUCATION/TRAINING PROGRAM

## 2025-02-05 RX ORDER — ESTRADIOL 0.1 MG/G
1 CREAM VAGINAL 2 TIMES WEEKLY
Qty: 42.5 G | Refills: 2 | Status: SHIPPED | OUTPATIENT
Start: 2025-02-06

## 2025-02-05 NOTE — ASSESSMENT & PLAN NOTE
- Symptoms stable on Estrace cream approximately every 2 weeks. Rx refilled today.  Orders:  •  estradiol (ESTRACE) 0.1 mg/g vaginal cream; Insert 1 g into the vagina 2 (two) times a week Use lowest frequency necessary to alleviate symptoms

## 2025-02-05 NOTE — PROGRESS NOTES
Name: Lincoln Olea      : 1965      MRN: [id number]  Encounter Provider: Matty Barroso MD  Encounter Date: 2025   Encounter department: [facility] [place]  :  Assessment & Plan  Encounter for annual routine gynecological examination  - Routine well gynecologic exam completed today.  - Cervical Cancer Screening: Current ASCCP Guidelines reviewed. Last Pap: 2022. History of abnormal: Yes, URBANO-1 in ; returned to normal. Repeat collected today, per patient request.   - STI screening offered including HIV testing: offered, pt declined  - Breast Cancer Screening: Last Mammogram 2024 (last on file, although she reports that she had a normal mammogram last month at [facility]) repeat ordered today.   - Colorectal cancer screening was not ordered, as she is up to date. Next due .  - Osteoporosis screening:   Does the patient have one or more of the following risk factors?    1) Personal history of fragility fracture? No    2) BMI <20 or body weight less that 127 lbs? No    3) Any of the following medical causes of increased bone loss?   Rheumatoid arthritis, HIV, anorexia, diabetes mellitus (type 1 or 2), diminished ovarian reserve, gastric bypass/malabsorptive disorder, hyperparathyroidism, hypocalcemia, premature menopause, POI, renal impairment, Stein's syndrome, vitamin D deficiency? Yes - low vitamin D  Medications: Antiepileptics, antiretrovirals, aromatase inhibitors, chemotherapy, DMPA, glucocorticoids, GnRH agonists/antagonists, heparin/LMWH? No    4) Parental history of hip fracture? Yes    5) Current smoker? Yes    6) Alcohol abuse (more than three drinks per day)? No   Based on above, the patient is at increased risk of osteoporosis.     Given increased risk, the patient's individualized risk of fracture was calculated using validated FRAX tool [https://www.ramos.ac.uk/FRAX/tool.aspx?country=9]    FRAX 10-year risk of major osteoporotic  "fracture: 15%. Based on score of > 8.4%, BMD screening with DXA is recommended now.  - The following were reviewed in today's visit: breast self exam, mammography screening ordered, osteoporosis, adequate intake of calcium and vitamin D, exercise, healthy diet, and DEXA ordered         Vaginal atrophy  - Symptoms stable on Estrace cream approximately every 2 weeks. Rx refilled today.  Orders:  •  estradiol (ESTRACE) 0.1 mg/g vaginal cream; Insert 1 g into the vagina 2 (two) times a week Use lowest frequency necessary to alleviate symptoms    Breast cancer screening by mammogram    Orders:  •  Mammo screening bilateral w 3d and cad; Future    Postmenopausal estrogen deficiency    Orders:  •  DXA bone density spine hip and pelvis; Future    At risk for osteoporosis    Orders:  •  DXA bone density spine hip and pelvis; Future    Screening for cervical cancer    Orders:  •  IGP, Aptima HPV, Rfx 16/18,45        History of Present Illness   HPI  Lincoln Olea is a 59 y.o. female who presents for routine gynecologic preventative care visit. She is without complaint today.   History obtained from: patient    Review of Systems   All other systems reviewed and are negative.  Medical History Reviewed by provider this encounter:  Tobacco  Meds  Med Hx  Surg Hx  Fam Hx  Soc Hx    .     Objective   /76   Ht 5' 6.25\" (1.683 m)   Wt 65.8 kg (145 lb)   BMI 23.23 kg/m²      Physical Exam  Vitals reviewed. Exam conducted with a chaperone present (Ana M Khan RN).   Constitutional:       General: She is not in acute distress.     Appearance: Normal appearance. She is well-developed.   HENT:      Head: Normocephalic.   Eyes:      Conjunctiva/sclera: Conjunctivae normal.   Cardiovascular:      Rate and Rhythm: Normal rate.   Pulmonary:      Effort: Pulmonary effort is normal. No respiratory distress.   Chest:      Chest wall: No mass or deformity.   Breasts:     Right: Normal. No mass, nipple discharge, skin change or " tenderness.      Left: Normal. No mass, nipple discharge, skin change or tenderness.   Abdominal:      General: Abdomen is flat.      Palpations: Abdomen is soft.      Tenderness: There is no abdominal tenderness.   Genitourinary:     General: Normal vulva.      Exam position: Lithotomy position.      Labia:         Right: No tenderness, lesion or injury.         Left: No tenderness, lesion or injury.       Urethra: No urethral pain, urethral swelling or urethral lesion.      Vagina: Normal. No vaginal discharge, bleeding, lesions or prolapsed vaginal walls.      Cervix: Normal. No cervical motion tenderness, discharge, friability, lesion, erythema or cervical bleeding.      Uterus: Normal. Not deviated and not tender.       Adnexa: Right adnexa normal and left adnexa normal.        Right: No mass, tenderness or fullness.          Left: No mass, tenderness or fullness.        Comments: Atrophic vaginal mucosa  Musculoskeletal:         General: No swelling.   Lymphadenopathy:      Upper Body:      Right upper body: No supraclavicular, axillary or pectoral adenopathy.      Left upper body: No supraclavicular, axillary or pectoral adenopathy.   Skin:     General: Skin is warm and dry.   Neurological:      Mental Status: She is alert.   Psychiatric:         Mood and Affect: Mood normal.         Behavior: Behavior normal.         Thought Content: Thought content normal.         Judgment: Judgment normal.

## 2025-02-11 ENCOUNTER — RESULTS FOLLOW-UP (OUTPATIENT)
Dept: OBGYN CLINIC | Facility: CLINIC | Age: 60
End: 2025-02-11

## 2025-03-11 ENCOUNTER — HOSPITAL ENCOUNTER (OUTPATIENT)
Dept: BONE DENSITY | Facility: IMAGING CENTER | Age: 60
Discharge: HOME | End: 2025-03-11
Payer: COMMERCIAL

## 2025-03-11 VITALS — WEIGHT: 146.6 LBS | BODY MASS INDEX: 23.56 KG/M2 | HEIGHT: 66 IN

## 2025-03-11 DIAGNOSIS — Z78.0 POSTMENOPAUSAL ESTROGEN DEFICIENCY: ICD-10-CM

## 2025-03-11 DIAGNOSIS — Z91.89 AT RISK FOR OSTEOPOROSIS: ICD-10-CM

## 2025-03-11 PROCEDURE — 77080 DXA BONE DENSITY AXIAL: CPT

## 2025-04-03 ENCOUNTER — TELEPHONE (OUTPATIENT)
Age: 60
End: 2025-04-03

## 2025-04-03 NOTE — TELEPHONE ENCOUNTER
Pt called to R/S appt she missed yesterday went to wrong office tried to call but states it was closed wants to be sure she will not be charged.

## 2025-04-10 ENCOUNTER — OFFICE VISIT (OUTPATIENT)
Dept: OBGYN CLINIC | Facility: CLINIC | Age: 60
End: 2025-04-10
Payer: COMMERCIAL

## 2025-04-10 VITALS
DIASTOLIC BLOOD PRESSURE: 68 MMHG | SYSTOLIC BLOOD PRESSURE: 128 MMHG | BODY MASS INDEX: 23.72 KG/M2 | WEIGHT: 147.6 LBS | HEIGHT: 66 IN

## 2025-04-10 DIAGNOSIS — M81.6 LOCALIZED OSTEOPOROSIS WITHOUT CURRENT PATHOLOGICAL FRACTURE: Primary | ICD-10-CM

## 2025-04-10 PROCEDURE — 99213 OFFICE O/P EST LOW 20 MIN: CPT | Performed by: STUDENT IN AN ORGANIZED HEALTH CARE EDUCATION/TRAINING PROGRAM

## 2025-04-10 RX ORDER — FAMOTIDINE 10 MG
10 TABLET ORAL 2 TIMES DAILY
COMMUNITY

## 2025-04-10 RX ORDER — MULTIVITAMIN WITH IRON
TABLET ORAL
COMMUNITY

## 2025-04-10 NOTE — PROGRESS NOTES
"Name: Lincoln Olea      : 1965      MRN: [id number]  Encounter Provider: Matty Barroso MD  Encounter Date: 4/10/2025   Encounter department: [facility] [place]  :  Assessment & Plan  Localized osteoporosis without current pathological fracture  - Discussed diagnosis of osteoporosis based on low bone density with elevated risk (24%) of major osteoporotic fracture based on FRAX index.   - Options for treatment reviewed, including preferred first-line treatment with bisphosphonate therapy (PO or IV) or subcutaneous denosumab.   - Following discussion, patient declines initiation of treatment at this time.   - Will check CBC, CMP, serum magnesium, TSH with reflex T4, and 25-hydroxy-vitamin D to evaluate for secondary causes of osteoporosis.   - Discussed daily recommended dietary calcium allowance of 1200 mg daily and recommended vitamin D dietary allowance of 600 \"international units\" daily prior to age 70.  - Recommended weight-bearing exercise and strength training for maintenance of bone density.   - Plan to repeat DXA 2+ years, as preferred by patient.   - Return to office for routine care or PRN.   Orders:  •  Basic metabolic panel; Future  •  TSH w/Reflex; Future  •  Vitamin D 25 hydroxy; Future  •  Magnesium; Future        History of Present Illness   HPI  Lincoln Olea is a 59 y.o. female who presents for follow up to discuss DXA results.   History obtained from: patient    Review of Systems   All other systems reviewed and are negative.    Medical History Reviewed by provider this encounter:  Tobacco  Med Hx  Surg Hx  Fam Hx  Soc Hx    .     Objective   /68 (BP Location: Left arm, Patient Position: Sitting, Cuff Size: Large)   Ht 5' 6\" (1.676 m)   Wt 67 kg (147 lb 9.6 oz)   BMI 23.82 kg/m²      Physical Exam  Vitals reviewed.   Neurological:      General: No focal deficit present.      Mental Status: She is alert.   Psychiatric:         Mood and Affect: Mood " normal.

## 2025-04-10 NOTE — ASSESSMENT & PLAN NOTE
"- Discussed diagnosis of osteoporosis based on low bone density with elevated risk (24%) of major osteoporotic fracture based on FRAX index.   - Options for treatment reviewed, including preferred first-line treatment with bisphosphonate therapy (PO or IV) or subcutaneous denosumab.   - Following discussion, patient declines initiation of treatment at this time.   - Will check CBC, CMP, serum magnesium, TSH with reflex T4, and 25-hydroxy-vitamin D to evaluate for secondary causes of osteoporosis.   - Discussed daily recommended dietary calcium allowance of 1200 mg daily and recommended vitamin D dietary allowance of 600 \"international units\" daily prior to age 70.  - Recommended weight-bearing exercise and strength training for maintenance of bone density.   - Plan to repeat DXA 2+ years, as preferred by patient.   - Return to office for routine care or PRN.   Orders:  •  Basic metabolic panel; Future  •  TSH w/Reflex; Future  •  Vitamin D 25 hydroxy; Future  •  Magnesium; Future  "

## 2025-05-09 DIAGNOSIS — K21.9 GASTROESOPHAGEAL REFLUX DISEASE WITHOUT ESOPHAGITIS: ICD-10-CM

## 2025-05-09 RX ORDER — OMEPRAZOLE 20 MG/1
20 CAPSULE, DELAYED RELEASE ORAL DAILY
Qty: 90 CAPSULE | Refills: 3 | OUTPATIENT
Start: 2025-05-09

## 2025-06-06 ENCOUNTER — NURSE TRIAGE (OUTPATIENT)
Age: 60
End: 2025-06-06

## 2025-06-06 NOTE — TELEPHONE ENCOUNTER
"REASON FOR CONVERSATION: Bloated    SYMPTOMS: Bloat, gassiness, intermittent lower abdominal cramping 7/10 that is worse after eating.    OTHER HEALTH INFORMATION: Pt currently without abdominal pain. She reports symptoms come and go every couple of weeks. Denies any other symptoms.     PROTOCOL DISPOSITION: Home Care    CARE ADVICE PROVIDED: GENERAL CARE ADVICE FOR BLOATING:  * Eat slowly. Chew your food fully. Try eating smaller meals. Stop eating before you are full.  * Drink enough liquids.  * Exercise regularly (even a daily 15-minute walk).  * Avoid eating foods that cause gas. Examples are beans, broccoli, brussel sprouts, cabbage, onions, potatoes.  * Avoid or limit the amount of carbonated (fizzy) beverages (such as soda) that you drink.  * If you have LACTOSE INTOLERANCE, avoid or limit milk and dairy products (such as cheese). Use lactose-free dairy products.  * If you have BLOATING AFTER EATING CERTAIN FOODS, change your diet and avoid those foods.      PRACTICE FOLLOW-UP: Pt is requesting provider feedback.      Reason for Disposition   Abdomen BLOATING (e.g., feeling full or gassy; no visible swelling)    Answer Assessment - Initial Assessment Questions  1. SYMPTOM: \"What's the main symptom you're concerned about?\" (e.g., abdomen bloating, swelling)      bloat  2. ONSET: \"When did  start?\"      yesterday  3. SEVERITY: \"How bad is the bloating or swelling?\"        4. ABDOMEN PAIN:  \"Is there any abdomen pain?\" If Yes, ask: \"How bad is the pain?\"  (e.g., Scale 1-10; mild, moderate, or severe)      7/10  5. RELIEVING AND AGGRAVATING FACTORS: \"What makes it better or worse?\" (e.g., certain foods, lactose, medicines)      Worse after eating   7. CAUSE: \"What do you think is causing the bloating?\"       Unsure   8. OTHER SYMPTOMS: \"Do you have any other symptoms?\" (e.g., belching, blood in stool, breathing difficulty, constipation, diarrhea, fever, passing gas, vomiting, weight loss, white of eyes have " turned yellow)      Lower abdominal pain 7/10, gassiness    Protocols used: Abdomen Bloating and Swelling-Adult-OH

## 2025-08-15 ENCOUNTER — OFFICE VISIT (OUTPATIENT)
Dept: GASTROENTEROLOGY | Facility: CLINIC | Age: 60
End: 2025-08-15
Payer: COMMERCIAL

## 2025-08-15 PROBLEM — K76.0 FATTY LIVER: Status: ACTIVE | Noted: 2022-01-13
